# Patient Record
Sex: MALE | Race: WHITE | NOT HISPANIC OR LATINO | Employment: FULL TIME | URBAN - METROPOLITAN AREA
[De-identification: names, ages, dates, MRNs, and addresses within clinical notes are randomized per-mention and may not be internally consistent; named-entity substitution may affect disease eponyms.]

---

## 2019-08-15 ENCOUNTER — TRANSCRIBE ORDERS (OUTPATIENT)
Dept: ADMINISTRATIVE | Facility: HOSPITAL | Age: 59
End: 2019-08-15

## 2019-08-15 ENCOUNTER — APPOINTMENT (OUTPATIENT)
Dept: RADIOLOGY | Facility: CLINIC | Age: 59
End: 2019-08-15
Payer: COMMERCIAL

## 2019-08-15 DIAGNOSIS — M25.551 RIGHT HIP PAIN: ICD-10-CM

## 2019-08-15 DIAGNOSIS — M25.551 RIGHT HIP PAIN: Primary | ICD-10-CM

## 2019-08-15 PROCEDURE — 73502 X-RAY EXAM HIP UNI 2-3 VIEWS: CPT

## 2020-12-22 ENCOUNTER — APPOINTMENT (OUTPATIENT)
Dept: RADIOLOGY | Facility: CLINIC | Age: 60
End: 2020-12-22
Payer: COMMERCIAL

## 2020-12-22 ENCOUNTER — TRANSCRIBE ORDERS (OUTPATIENT)
Dept: URGENT CARE | Facility: CLINIC | Age: 60
End: 2020-12-22

## 2020-12-22 DIAGNOSIS — M25.551 RIGHT HIP PAIN: ICD-10-CM

## 2020-12-22 DIAGNOSIS — M25.551 RIGHT HIP PAIN: Primary | ICD-10-CM

## 2020-12-22 PROCEDURE — 73502 X-RAY EXAM HIP UNI 2-3 VIEWS: CPT

## 2021-03-01 DIAGNOSIS — K21.9 GASTROESOPHAGEAL REFLUX DISEASE WITHOUT ESOPHAGITIS: Primary | ICD-10-CM

## 2021-03-01 RX ORDER — OMEPRAZOLE 20 MG/1
20 CAPSULE, DELAYED RELEASE ORAL DAILY
Qty: 90 CAPSULE | Refills: 1 | Status: SHIPPED | OUTPATIENT
Start: 2021-03-01 | End: 2021-12-02

## 2021-03-01 NOTE — TELEPHONE ENCOUNTER
Left message on cell for patient to call and schedule f/u ov  Will call again in 1 wk if he doesn't return call

## 2021-03-08 NOTE — TELEPHONE ENCOUNTER
Left message on patient's cell to call and schedule f/u ov with Dr Max Guerra  Will call one more time in 2 weeks if he doesn't return call to schedule

## 2021-03-22 NOTE — TELEPHONE ENCOUNTER
Called and spoke with wife  He got my message and will call when ready to schedule  Will wait for patient to call

## 2021-08-19 ENCOUNTER — TELEPHONE (OUTPATIENT)
Dept: GASTROENTEROLOGY | Facility: CLINIC | Age: 61
End: 2021-08-19

## 2021-09-28 ENCOUNTER — TELEPHONE (OUTPATIENT)
Dept: GASTROENTEROLOGY | Facility: CLINIC | Age: 61
End: 2021-09-28

## 2021-09-28 NOTE — TELEPHONE ENCOUNTER
Recall call went out via [employer] in 2020 with no return calls from pt to schedule  Pt is overdue for both EGD and Colon with Dr Carl Culver for hx of adenomatous colon polyps/Padilla's  I lmohm for pt to please call back to schedule a procedure with Dr Carl Culver  If do not hear back form pt in two weeks, will mail recall letter for both EGD and colon

## 2021-10-27 NOTE — TELEPHONE ENCOUNTER
Returned Patient call to schedule FLIP with Dr Remington Whelan  Will call him when January schedule opens

## 2021-11-06 ENCOUNTER — HOSPITAL ENCOUNTER (EMERGENCY)
Facility: HOSPITAL | Age: 61
Discharge: HOME | End: 2021-11-06
Attending: EMERGENCY MEDICINE | Admitting: EMERGENCY MEDICINE
Payer: COMMERCIAL

## 2021-11-06 ENCOUNTER — APPOINTMENT (EMERGENCY)
Dept: RADIOLOGY | Facility: HOSPITAL | Age: 61
End: 2021-11-06
Payer: COMMERCIAL

## 2021-11-06 VITALS
TEMPERATURE: 99.7 F | RESPIRATION RATE: 18 BRPM | DIASTOLIC BLOOD PRESSURE: 61 MMHG | BODY MASS INDEX: 29.71 KG/M2 | WEIGHT: 210 LBS | SYSTOLIC BLOOD PRESSURE: 116 MMHG | HEART RATE: 78 BPM | OXYGEN SATURATION: 93 %

## 2021-11-06 DIAGNOSIS — R55 SYNCOPE: Primary | ICD-10-CM

## 2021-11-06 DIAGNOSIS — I95.1 ORTHOSTATIC HYPOTENSION: ICD-10-CM

## 2021-11-06 DIAGNOSIS — U07.1 COVID-19: ICD-10-CM

## 2021-11-06 LAB
ALBUMIN SERPL BCP-MCNC: 3.7 G/DL (ref 3.5–5)
ALP SERPL-CCNC: 67 U/L (ref 46–116)
ALT SERPL W P-5'-P-CCNC: 65 U/L (ref 12–78)
ANION GAP SERPL CALCULATED.3IONS-SCNC: 9 MMOL/L (ref 4–13)
APTT PPP: 35 SECONDS (ref 23–37)
AST SERPL W P-5'-P-CCNC: 95 U/L (ref 5–45)
BASOPHILS # BLD AUTO: 0.01 THOUSANDS/ΜL (ref 0–0.1)
BASOPHILS NFR BLD AUTO: 0 % (ref 0–1)
BILIRUB SERPL-MCNC: 0.63 MG/DL (ref 0.2–1)
BUN SERPL-MCNC: 19 MG/DL (ref 5–25)
CALCIUM SERPL-MCNC: 8.5 MG/DL (ref 8.3–10.1)
CHLORIDE SERPL-SCNC: 97 MMOL/L (ref 100–108)
CO2 SERPL-SCNC: 26 MMOL/L (ref 21–32)
CREAT SERPL-MCNC: 1.42 MG/DL (ref 0.6–1.3)
EOSINOPHIL # BLD AUTO: 0 THOUSAND/ΜL (ref 0–0.61)
EOSINOPHIL NFR BLD AUTO: 0 % (ref 0–6)
ERYTHROCYTE [DISTWIDTH] IN BLOOD BY AUTOMATED COUNT: 13.1 % (ref 11.6–15.1)
GFR SERPL CREATININE-BSD FRML MDRD: 54 ML/MIN/1.73SQ M
GLUCOSE SERPL-MCNC: 136 MG/DL (ref 65–140)
HCT VFR BLD AUTO: 42 % (ref 36.5–49.3)
HGB BLD-MCNC: 14.3 G/DL (ref 12–17)
IMM GRANULOCYTES # BLD AUTO: 0.03 THOUSAND/UL (ref 0–0.2)
IMM GRANULOCYTES NFR BLD AUTO: 1 % (ref 0–2)
INR PPP: 1.13 (ref 0.84–1.19)
LACTATE SERPL-SCNC: 1.6 MMOL/L (ref 0.5–2)
LYMPHOCYTES # BLD AUTO: 0.65 THOUSANDS/ΜL (ref 0.6–4.47)
LYMPHOCYTES NFR BLD AUTO: 11 % (ref 14–44)
MAGNESIUM SERPL-MCNC: 2 MG/DL (ref 1.6–2.6)
MCH RBC QN AUTO: 29.6 PG (ref 26.8–34.3)
MCHC RBC AUTO-ENTMCNC: 34 G/DL (ref 31.4–37.4)
MCV RBC AUTO: 87 FL (ref 82–98)
MONOCYTES # BLD AUTO: 0.42 THOUSAND/ΜL (ref 0.17–1.22)
MONOCYTES NFR BLD AUTO: 7 % (ref 4–12)
NEUTROPHILS # BLD AUTO: 5.06 THOUSANDS/ΜL (ref 1.85–7.62)
NEUTS SEG NFR BLD AUTO: 81 % (ref 43–75)
NRBC BLD AUTO-RTO: 0 /100 WBCS
PLATELET # BLD AUTO: 165 THOUSANDS/UL (ref 149–390)
PMV BLD AUTO: 9.4 FL (ref 8.9–12.7)
POTASSIUM SERPL-SCNC: 5.4 MMOL/L (ref 3.5–5.3)
PROT SERPL-MCNC: 7.8 G/DL (ref 6.4–8.2)
PROTHROMBIN TIME: 14.3 SECONDS (ref 11.6–14.5)
RBC # BLD AUTO: 4.83 MILLION/UL (ref 3.88–5.62)
SODIUM SERPL-SCNC: 132 MMOL/L (ref 136–145)
TROPONIN I SERPL-MCNC: <0.02 NG/ML
WBC # BLD AUTO: 6.17 THOUSAND/UL (ref 4.31–10.16)

## 2021-11-06 PROCEDURE — 96361 HYDRATE IV INFUSION ADD-ON: CPT

## 2021-11-06 PROCEDURE — 36415 COLL VENOUS BLD VENIPUNCTURE: CPT | Performed by: EMERGENCY MEDICINE

## 2021-11-06 PROCEDURE — 85025 COMPLETE CBC W/AUTO DIFF WBC: CPT | Performed by: EMERGENCY MEDICINE

## 2021-11-06 PROCEDURE — 85610 PROTHROMBIN TIME: CPT | Performed by: EMERGENCY MEDICINE

## 2021-11-06 PROCEDURE — 83735 ASSAY OF MAGNESIUM: CPT | Performed by: EMERGENCY MEDICINE

## 2021-11-06 PROCEDURE — 80053 COMPREHEN METABOLIC PANEL: CPT | Performed by: EMERGENCY MEDICINE

## 2021-11-06 PROCEDURE — 99284 EMERGENCY DEPT VISIT MOD MDM: CPT

## 2021-11-06 PROCEDURE — 71045 X-RAY EXAM CHEST 1 VIEW: CPT

## 2021-11-06 PROCEDURE — 87040 BLOOD CULTURE FOR BACTERIA: CPT | Performed by: EMERGENCY MEDICINE

## 2021-11-06 PROCEDURE — 83605 ASSAY OF LACTIC ACID: CPT | Performed by: EMERGENCY MEDICINE

## 2021-11-06 PROCEDURE — 96360 HYDRATION IV INFUSION INIT: CPT

## 2021-11-06 PROCEDURE — 84484 ASSAY OF TROPONIN QUANT: CPT | Performed by: EMERGENCY MEDICINE

## 2021-11-06 PROCEDURE — 85730 THROMBOPLASTIN TIME PARTIAL: CPT | Performed by: EMERGENCY MEDICINE

## 2021-11-06 PROCEDURE — 99284 EMERGENCY DEPT VISIT MOD MDM: CPT | Performed by: EMERGENCY MEDICINE

## 2021-11-06 RX ORDER — VALSARTAN 40 MG/1
TABLET ORAL DAILY
COMMUNITY

## 2021-11-06 RX ORDER — OMEPRAZOLE 40 MG/1
1 CAPSULE, DELAYED RELEASE ORAL DAILY
COMMUNITY
Start: 2012-02-27

## 2021-11-06 RX ORDER — ATORVASTATIN CALCIUM 10 MG/1
TABLET, FILM COATED ORAL DAILY
COMMUNITY

## 2021-11-06 RX ORDER — ACETAMINOPHEN 325 MG/1
975 TABLET ORAL ONCE
Status: COMPLETED | OUTPATIENT
Start: 2021-11-06 | End: 2021-11-06

## 2021-11-12 LAB
BACTERIA BLD CULT: NORMAL
BACTERIA BLD CULT: NORMAL

## 2021-11-16 ENCOUNTER — TELEPHONE (OUTPATIENT)
Dept: GASTROENTEROLOGY | Facility: CLINIC | Age: 61
End: 2021-11-16

## 2021-12-02 DIAGNOSIS — K21.9 GASTROESOPHAGEAL REFLUX DISEASE WITHOUT ESOPHAGITIS: ICD-10-CM

## 2021-12-02 RX ORDER — OMEPRAZOLE 20 MG/1
CAPSULE, DELAYED RELEASE ORAL
Qty: 90 CAPSULE | Refills: 1 | Status: SHIPPED | OUTPATIENT
Start: 2021-12-02 | End: 2022-06-08

## 2022-09-04 DIAGNOSIS — K21.9 GASTROESOPHAGEAL REFLUX DISEASE WITHOUT ESOPHAGITIS: ICD-10-CM

## 2022-09-08 RX ORDER — OMEPRAZOLE 20 MG/1
CAPSULE, DELAYED RELEASE ORAL
Qty: 90 CAPSULE | Refills: 0 | Status: SHIPPED | OUTPATIENT
Start: 2022-09-08

## 2022-11-28 ENCOUNTER — OFFICE VISIT (OUTPATIENT)
Dept: GASTROENTEROLOGY | Facility: CLINIC | Age: 62
End: 2022-11-28

## 2022-11-28 VITALS
HEIGHT: 71 IN | BODY MASS INDEX: 30.49 KG/M2 | HEART RATE: 83 BPM | DIASTOLIC BLOOD PRESSURE: 81 MMHG | SYSTOLIC BLOOD PRESSURE: 142 MMHG | WEIGHT: 217.8 LBS

## 2022-11-28 DIAGNOSIS — Z86.010 HX OF COLONIC POLYPS: ICD-10-CM

## 2022-11-28 DIAGNOSIS — K21.9 GASTROESOPHAGEAL REFLUX DISEASE WITHOUT ESOPHAGITIS: ICD-10-CM

## 2022-11-28 DIAGNOSIS — K22.70 BARRETT'S ESOPHAGUS WITHOUT DYSPLASIA: Primary | ICD-10-CM

## 2022-11-28 DIAGNOSIS — Z12.11 COLON CANCER SCREENING: ICD-10-CM

## 2022-11-28 DIAGNOSIS — Z86.010 HX OF ADENOMATOUS COLONIC POLYPS: ICD-10-CM

## 2022-11-28 PROBLEM — Z86.0100 HX OF COLONIC POLYPS: Status: ACTIVE | Noted: 2022-11-28

## 2022-11-28 RX ORDER — OMEPRAZOLE 20 MG/1
20 CAPSULE, DELAYED RELEASE ORAL DAILY
Qty: 90 CAPSULE | Refills: 3 | Status: SHIPPED | OUTPATIENT
Start: 2022-11-28 | End: 2023-02-26

## 2022-11-28 RX ORDER — SILDENAFIL 100 MG/1
100 TABLET, FILM COATED ORAL DAILY
COMMUNITY
Start: 2022-10-03

## 2022-11-28 NOTE — PATIENT INSTRUCTIONS
Continue to follow anti-reflux diet and measures    Scheduled date of EGD/colonoscopy (as of today): 1/16/23  Physician performing EGD/colonoscopy: Dr Estelle Vegas  Location of EGD/colonoscopy: [place] [id number]  Desired bowel prep reviewed with patient: Golytely  Instructions reviewed with patient by: Mallory   Clearances:   N/A  Pt is vaccinated

## 2022-11-28 NOTE — PROGRESS NOTES
[facility] - Outpatient Follow-up Note  Jovana Harris 63 y o  male MRN: [id number]  Encounter: [id number]          ASSESSMENT AND PLAN:      1  Gastroesophageal reflux disease without esophagitis  Patient has history of GERD but reports symptoms are well controlled on current medication  - omeprazole (PriLOSEC) 20 mg delayed release capsule; Take 1 capsule (20 mg total) by mouth daily Take 1/2 hour prior to breakfast  Dispense: 90 capsule; Refill: 3  -Reviewed side effects of PPI  Patient verbalized understanding   - EGD; further evaluation of GERD will be done at time of EGD  -Continue anti-reflux diet and measures    2  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  Patient has history of Padilla's esophagus  Last EGD done on 02/06/2000 which showed antritis, hiatal hernia, short-segment spirits  Biopsy stomach showed chronic inflammation of gastric type mucosa  Negative for dysplasia  Minimal focal intestinal metaplasia noted  Biopsy of EG junction showed chronic inflammation gastric type mucosa  Minimal focal intestinal metaplasia  Negative for dysplasia  - EGD; schedule for EGD  Prep and procedure explained to patient in detail  Further recommendations pending results of EGD  3  Hx of colonic polyps  Patient has history colonic polyps  Colonoscopy done 04/08/2015 which showed universal diverticulosis  Two polyps removed and internal hemorrhoids  Biopsy showed tubular adenoma negative for high-grade dysplasia and hyperplastic polyp   - Colonoscopy; schedule for colonoscopy  Prep and procedure explained to patient in detail  Further recommendations pending results of colonoscopy  - polyethylene glycol (GOLYTELY) 4000 mL solution; Take 4,000 mL by mouth once for 1 dose  Dispense: 4000 mL; Refill: 0    4  Colon cancer screening  Up-to-date  Last colonoscopy done 04/08/2015 which showed universal diverticulosis  Two polyps removed and internal hemorrhoids  Biopsy showed tubular adenoma negative for high-grade dysplasia and hyperplastic polyp       ______________________________________________________________________    SUBJECTIVE:  This is a 46-year-old male who presents to office for follow-up of GERD, Padilla's esophagus, and colon polyps  Patient currently denies any GI symptoms as long as he is taking his omeprazole  Patient denies nausea, vomiting, acid reflux, heartburn, dysphagia, epigastric or abdominal pain  Patient denies blood in stool, blood from rectal area, or black tarry stool  Bowel patterns regular  Mild tenderness in epigastric area on examination  Patient is overdue for colonoscopy for surveillance of colon polyps  Patient is on no blood thinners  EGD done on 02/06/2000 which showed antritis, hiatal hernia, short-segment spirits  Biopsy stomach showed chronic inflammation of gastric type mucosa  Negative for dysplasia  Minimal focal intestinal metaplasia noted  Biopsy of EG junction showed chronic inflammation gastric type mucosa  Minimal focal intestinal metaplasia  Negative for dysplasia  Colonoscopy done 04/08/2015 which showed universal diverticulosis  Two polyps removed and internal hemorrhoids  Biopsy showed tubular adenoma negative for high-grade dysplasia and hyperplastic polyp  Patient is a current smoker  Positive family history of esophageal cancer sister  No family history of gastric or colon cancer  REVIEW OF SYSTEMS IS OTHERWISE NEGATIVE        Historical Information   Past Medical History:   Diagnosis Date   • COPD (chronic obstructive pulmonary disease) ([facility] [address] )    • Diverticulitis    • Diverticulosis      Past Surgical History:   Procedure Laterality Date   • BOWEL RESECTION       Social History   Social History     Substance and Sexual Activity   Alcohol Use Not Currently     Social History     Substance and Sexual Activity   Drug Use Not Currently   • Types: Marijuana     Social History     Tobacco Use   Smoking Status Every Day   • Packs/day: 1 00   • Types: Cigarettes   Smokeless Tobacco Never     Family History   Problem Relation Age of Onset   • Esophageal cancer Sister        Meds/Allergies       Current Outpatient Medications:   •  atorvastatin (LIPITOR) 10 mg tablet  •  omeprazole (PriLOSEC) 20 mg delayed release capsule  •  polyethylene glycol (GOLYTELY) 4000 mL solution  •  valsartan (DIOVAN) 40 mg tablet  •  sildenafil (VIAGRA) 100 mg tablet    Allergies   Allergen Reactions   • Nsaids Hives     Reaction Date: 27Feb2012;              Objective     Blood pressure 142/81, pulse 83, height 5' 11" (1 803 m), weight 98 8 kg (217 lb 12 8 oz)  Body mass index is 30 38 kg/m²  PHYSICAL EXAM:      General Appearance:   Alert, cooperative, no distress   HEENT:   Normocephalic, atraumatic, anicteric      Neck:  Supple, symmetrical, trachea midline   Lungs:   Clear to auscultation bilaterally; no rales, rhonchi or wheezing; respirations unlabored    Heart[de-identified]   Regular rate and rhythm; no murmur, rub, or gallop  Abdomen:   Soft, mild tenderness in epigastric area with palpation, non-distended; normal bowel sounds; no masses, no organomegaly    Genitalia:   Deferred    Rectal:   Deferred    Extremities:  No cyanosis, clubbing or edema    Pulses:  2+ and symmetric    Skin:  No jaundice, rashes, or lesions    Lymph nodes:  No palpable cervical lymphadenopathy        Lab Results:   No visits with results within 1 Day(s) from this visit     Latest known visit with results is:   Admission on 11/06/2021, Discharged on 11/06/2021   Component Date Value   • Sodium 11/06/2021 132 (L)    • Potassium 11/06/2021 5 4 (H)    • Chloride 11/06/2021 97 (L)    • CO2 11/06/2021 26    • ANION GAP 11/06/2021 9    • BUN 11/06/2021 19    • Creatinine 11/06/2021 1 42 (H)    • Glucose 11/06/2021 136    • Calcium 11/06/2021 8 5    • AST 11/06/2021 95 (H)    • ALT 11/06/2021 65    • Alkaline Phosphatase 11/06/2021 67    • Total Protein 11/06/2021 7 8    • Albumin 11/06/2021 3 7    • Total Bilirubin 11/06/2021 0 63    • eGFR 11/06/2021 54    • Magnesium 11/06/2021 2 0    • Troponin I 11/06/2021 <0 02    • Protime 11/06/2021 14 3    • INR 11/06/2021 1 13    • PTT 11/06/2021 35    • LACTIC ACID 11/06/2021 1 6    • Blood Culture 11/06/2021 No Growth After 5 Days  • Blood Culture 11/06/2021 No Growth After 5 Days  • WBC 11/06/2021 6 17    • RBC 11/06/2021 4 83    • Hemoglobin 11/06/2021 14 3    • Hematocrit 11/06/2021 42 0    • MCV 11/06/2021 87    • MCH 11/06/2021 29 6    • MCHC 11/06/2021 34 0    • RDW 11/06/2021 13 1    • MPV 11/06/2021 9 4    • Platelets 01/52/9434 165    • nRBC 11/06/2021 0    • Neutrophils Relative 11/06/2021 81 (H)    • Immat GRANS % 11/06/2021 1    • Lymphocytes Relative 11/06/2021 11 (L)    • Monocytes Relative 11/06/2021 7    • Eosinophils Relative 11/06/2021 0    • Basophils Relative 11/06/2021 0    • Neutrophils Absolute 11/06/2021 5 06    • Immature Grans Absolute 11/06/2021 0 03    • Lymphocytes Absolute 11/06/2021 0 65    • Monocytes Absolute 11/06/2021 0 42    • Eosinophils Absolute 11/06/2021 0 00    • Basophils Absolute 11/06/2021 0 01          Radiology Results:   No results found

## 2023-01-05 DIAGNOSIS — Z20.822 COVID-19 RULED OUT BY LABORATORY TESTING: Primary | ICD-10-CM

## 2023-01-10 ENCOUNTER — TELEPHONE (OUTPATIENT)
Dept: GASTROENTEROLOGY | Facility: CLINIC | Age: 63
End: 2023-01-10

## 2023-01-10 NOTE — TELEPHONE ENCOUNTER
Spoke to pt's wife confirming pt's  egd/confirmation scheduled on 1/16/23 at [facility] with Dr Leopoldo Blew  Informed [facility] would be calling the day prior with the arrival time  Pt has instructions and did not have any questions

## 2023-01-16 ENCOUNTER — HOSPITAL ENCOUNTER (OUTPATIENT)
Facility: HOSPITAL | Age: 63
Setting detail: OBSERVATION
Discharge: HOME | End: 2023-01-17
Attending: FAMILY MEDICINE | Admitting: FAMILY MEDICINE

## 2023-01-16 ENCOUNTER — ANESTHESIA EVENT (OUTPATIENT)
Dept: GASTROENTEROLOGY | Facility: AMBULARY SURGERY CENTER | Age: 63
End: 2023-01-16

## 2023-01-16 ENCOUNTER — HOSPITAL ENCOUNTER (OUTPATIENT)
Dept: GASTROENTEROLOGY | Facility: AMBULARY SURGERY CENTER | Age: 63
Setting detail: OUTPATIENT SURGERY
Discharge: HOME | End: 2023-01-16
Attending: INTERNAL MEDICINE

## 2023-01-16 ENCOUNTER — ANESTHESIA (OUTPATIENT)
Dept: GASTROENTEROLOGY | Facility: AMBULARY SURGERY CENTER | Age: 63
End: 2023-01-16

## 2023-01-16 VITALS
WEIGHT: 220 LBS | HEART RATE: 58 BPM | DIASTOLIC BLOOD PRESSURE: 73 MMHG | BODY MASS INDEX: 30.68 KG/M2 | OXYGEN SATURATION: 97 % | RESPIRATION RATE: 18 BRPM | TEMPERATURE: 98 F | SYSTOLIC BLOOD PRESSURE: 137 MMHG

## 2023-01-16 DIAGNOSIS — Z86.010 HX OF ADENOMATOUS COLONIC POLYPS: ICD-10-CM

## 2023-01-16 DIAGNOSIS — K21.9 GASTROESOPHAGEAL REFLUX DISEASE WITHOUT ESOPHAGITIS: ICD-10-CM

## 2023-01-16 DIAGNOSIS — K22.70 BARRETT'S ESOPHAGUS WITHOUT DYSPLASIA: ICD-10-CM

## 2023-01-16 DIAGNOSIS — Z86.010 HX OF COLONIC POLYPS: ICD-10-CM

## 2023-01-16 PROBLEM — E78.5 DYSLIPIDEMIA: Status: ACTIVE | Noted: 2023-01-16

## 2023-01-16 PROBLEM — K92.2 GI BLEED: Status: ACTIVE | Noted: 2023-01-16

## 2023-01-16 LAB
ALBUMIN SERPL BCP-MCNC: 3.6 G/DL (ref 3.5–5)
ALP SERPL-CCNC: 69 U/L (ref 46–116)
ALT SERPL W P-5'-P-CCNC: 41 U/L (ref 12–78)
ANION GAP SERPL CALCULATED.3IONS-SCNC: 7 MMOL/L (ref 4–13)
AST SERPL W P-5'-P-CCNC: 24 U/L (ref 5–45)
BASOPHILS # BLD AUTO: 0.05 THOUSANDS/ÂΜL (ref 0–0.1)
BASOPHILS NFR BLD AUTO: 1 % (ref 0–1)
BILIRUB SERPL-MCNC: 0.54 MG/DL (ref 0.2–1)
BUN SERPL-MCNC: 16 MG/DL (ref 5–25)
CALCIUM SERPL-MCNC: 8.4 MG/DL (ref 8.3–10.1)
CHLORIDE SERPL-SCNC: 105 MMOL/L (ref 96–108)
CO2 SERPL-SCNC: 27 MMOL/L (ref 21–32)
CREAT SERPL-MCNC: 1.18 MG/DL (ref 0.6–1.3)
EOSINOPHIL # BLD AUTO: 0.09 THOUSAND/ÂΜL (ref 0–0.61)
EOSINOPHIL NFR BLD AUTO: 1 % (ref 0–6)
ERYTHROCYTE [DISTWIDTH] IN BLOOD BY AUTOMATED COUNT: 12.7 % (ref 11.6–15.1)
GFR SERPL CREATININE-BSD FRML MDRD: 66 ML/MIN/1.73SQ M
GLUCOSE SERPL-MCNC: 105 MG/DL (ref 65–140)
HCT VFR BLD AUTO: 40.5 % (ref 36.5–49.3)
HCT VFR BLD AUTO: 40.9 % (ref 36.5–49.3)
HGB BLD-MCNC: 13.6 G/DL (ref 12–17)
HGB BLD-MCNC: 13.7 G/DL (ref 12–17)
IMM GRANULOCYTES # BLD AUTO: 0.02 THOUSAND/UL (ref 0–0.2)
IMM GRANULOCYTES NFR BLD AUTO: 0 % (ref 0–2)
LYMPHOCYTES # BLD AUTO: 1.82 THOUSANDS/ÂΜL (ref 0.6–4.47)
LYMPHOCYTES NFR BLD AUTO: 24 % (ref 14–44)
MCH RBC QN AUTO: 29.9 PG (ref 26.8–34.3)
MCHC RBC AUTO-ENTMCNC: 33.5 G/DL (ref 31.4–37.4)
MCV RBC AUTO: 89 FL (ref 82–98)
MONOCYTES # BLD AUTO: 0.48 THOUSAND/ÂΜL (ref 0.17–1.22)
MONOCYTES NFR BLD AUTO: 6 % (ref 4–12)
NEUTROPHILS # BLD AUTO: 4.99 THOUSANDS/ÂΜL (ref 1.85–7.62)
NEUTS SEG NFR BLD AUTO: 68 % (ref 43–75)
NRBC BLD AUTO-RTO: 0 /100 WBCS
PLATELET # BLD AUTO: 249 THOUSANDS/UL (ref 149–390)
PMV BLD AUTO: 9.4 FL (ref 8.9–12.7)
POTASSIUM SERPL-SCNC: 3.5 MMOL/L (ref 3.5–5.3)
PROT SERPL-MCNC: 6.4 G/DL (ref 6.4–8.4)
RBC # BLD AUTO: 4.58 MILLION/UL (ref 3.88–5.62)
SODIUM SERPL-SCNC: 139 MMOL/L (ref 135–147)
WBC # BLD AUTO: 7.45 THOUSAND/UL (ref 4.31–10.16)

## 2023-01-16 RX ORDER — LIDOCAINE HYDROCHLORIDE 20 MG/ML
INJECTION, SOLUTION EPIDURAL; INFILTRATION; INTRACAUDAL; PERINEURAL AS NEEDED
Status: DISCONTINUED | OUTPATIENT
Start: 2023-01-16 | End: 2023-01-16

## 2023-01-16 RX ORDER — ATORVASTATIN CALCIUM 20 MG/1
20 TABLET, FILM COATED ORAL
Status: DISCONTINUED | OUTPATIENT
Start: 2023-01-16 | End: 2023-01-17 | Stop reason: HOSPADM

## 2023-01-16 RX ORDER — SODIUM CHLORIDE, SODIUM LACTATE, POTASSIUM CHLORIDE, CALCIUM CHLORIDE 600; 310; 30; 20 MG/100ML; MG/100ML; MG/100ML; MG/100ML
INJECTION, SOLUTION INTRAVENOUS CONTINUOUS PRN
Status: DISCONTINUED | OUTPATIENT
Start: 2023-01-16 | End: 2023-01-16

## 2023-01-16 RX ORDER — METHOCARBAMOL 500 MG/1
500 TABLET, FILM COATED ORAL EVERY 8 HOURS SCHEDULED
Status: DISCONTINUED | OUTPATIENT
Start: 2023-01-16 | End: 2023-01-17 | Stop reason: HOSPADM

## 2023-01-16 RX ORDER — LOSARTAN POTASSIUM 50 MG/1
100 TABLET ORAL DAILY
Status: DISCONTINUED | OUTPATIENT
Start: 2023-01-17 | End: 2023-01-17 | Stop reason: HOSPADM

## 2023-01-16 RX ORDER — ACETAMINOPHEN 325 MG/1
650 TABLET ORAL EVERY 6 HOURS PRN
Status: DISCONTINUED | OUTPATIENT
Start: 2023-01-16 | End: 2023-01-17 | Stop reason: HOSPADM

## 2023-01-16 RX ORDER — PROPOFOL 10 MG/ML
INJECTION, EMULSION INTRAVENOUS CONTINUOUS PRN
Status: DISCONTINUED | OUTPATIENT
Start: 2023-01-16 | End: 2023-01-16

## 2023-01-16 RX ORDER — PROPOFOL 10 MG/ML
INJECTION, EMULSION INTRAVENOUS AS NEEDED
Status: DISCONTINUED | OUTPATIENT
Start: 2023-01-16 | End: 2023-01-16

## 2023-01-16 RX ORDER — METHOCARBAMOL 500 MG/1
500 TABLET, FILM COATED ORAL EVERY 8 HOURS SCHEDULED
Status: DISCONTINUED | OUTPATIENT
Start: 2023-01-16 | End: 2023-01-16

## 2023-01-16 RX ORDER — PANTOPRAZOLE SODIUM 40 MG/1
40 TABLET, DELAYED RELEASE ORAL
Status: DISCONTINUED | OUTPATIENT
Start: 2023-01-17 | End: 2023-01-17 | Stop reason: HOSPADM

## 2023-01-16 RX ADMIN — ATORVASTATIN CALCIUM 20 MG: 20 TABLET, FILM COATED ORAL at 17:01

## 2023-01-16 RX ADMIN — PROPOFOL 50 MG: 10 INJECTION, EMULSION INTRAVENOUS at 10:40

## 2023-01-16 RX ADMIN — LIDOCAINE HYDROCHLORIDE 100 MG: 20 INJECTION, SOLUTION EPIDURAL; INFILTRATION; INTRACAUDAL; PERINEURAL at 10:39

## 2023-01-16 RX ADMIN — PROPOFOL 100 MG: 10 INJECTION, EMULSION INTRAVENOUS at 10:39

## 2023-01-16 RX ADMIN — PROPOFOL 50 MG: 10 INJECTION, EMULSION INTRAVENOUS at 10:43

## 2023-01-16 RX ADMIN — ACETAMINOPHEN 650 MG: 325 TABLET, FILM COATED ORAL at 15:50

## 2023-01-16 RX ADMIN — SODIUM CHLORIDE, SODIUM LACTATE, POTASSIUM CHLORIDE, AND CALCIUM CHLORIDE: .6; .31; .03; .02 INJECTION, SOLUTION INTRAVENOUS at 10:38

## 2023-01-16 RX ADMIN — PROPOFOL 130 MCG/KG/MIN: 10 INJECTION, EMULSION INTRAVENOUS at 10:48

## 2023-01-16 NOTE — PLAN OF CARE
Problem: Potential for Falls  Goal: Patient will remain free of falls  Description: INTERVENTIONS:  - Educate patient/family on patient safety including physical limitations  - Instruct patient to call for assistance with activity   - Consult OT/PT to assist with strengthening/mobility   - Keep Call bell within reach  - Keep bed low and locked with side rails adjusted as appropriate  - Keep care items and personal belongings within reach  - Initiate and maintain comfort rounds  - Make Fall Risk Sign visible to staff  - Offer Toileting every 2 Hours, in advance of need  - Initiate/Maintain bed/chair alarm  - Obtain necessary fall risk management equipment: bed/chair alarm  - Apply yellow socks and bracelet for high fall risk patients  - Consider moving patient to room near nurses station  Outcome: Progressing     Problem: PAIN - ADULT  Goal: Verbalizes/displays adequate comfort level or baseline comfort level  Description: Interventions:  - Encourage patient to monitor pain and request assistance  - Assess pain using appropriate pain scale  - Administer analgesics based on type and severity of pain and evaluate response  - Implement non-pharmacological measures as appropriate and evaluate response  - Consider cultural and social influences on pain and pain management  - Notify physician/advanced practitioner if interventions unsuccessful or patient reports new pain  Outcome: Progressing     Problem: INFECTION - ADULT  Goal: Absence or prevention of progression during hospitalization  Description: INTERVENTIONS:  - Assess and monitor for signs and symptoms of infection  - Monitor lab/diagnostic results  - Monitor all insertion sites, i e  indwelling lines, tubes, and drains  - Monitor endotracheal if appropriate and nasal secretions for changes in amount and color  - O'Brien appropriate cooling/warming therapies per order  - Administer medications as ordered  - Instruct and encourage patient and family to use good hand hygiene technique  - Identify and instruct in appropriate isolation precautions for identified infection/condition  Outcome: Progressing     Problem: SAFETY ADULT  Goal: Patient will remain free of falls  Description: INTERVENTIONS:  - Educate patient/family on patient safety including physical limitations  - Instruct patient to call for assistance with activity   - Consult OT/PT to assist with strengthening/mobility   - Keep Call bell within reach  - Keep bed low and locked with side rails adjusted as appropriate  - Keep care items and personal belongings within reach  - Initiate and maintain comfort rounds  - Make Fall Risk Sign visible to staff  - Offer Toileting every 2 Hours, in advance of need  - Initiate/Maintain bed/chair alarm  - Obtain necessary fall risk management equipment: bed/chair alarm  - Apply yellow socks and bracelet for high fall risk patients  - Consider moving patient to room near nurses station  Outcome: Progressing  Goal: Maintain or return to baseline ADL function  Description: INTERVENTIONS:  -  Assess patient's ability to carry out ADLs; assess patient's baseline for ADL function and identify physical deficits which impact ability to perform ADLs (bathing, care of mouth/teeth, toileting, grooming, dressing, etc )  - Assess/evaluate cause of self-care deficits   - Assess range of motion  - Assess patient's mobility; develop plan if impaired  - Assess patient's need for assistive devices and provide as appropriate  - Encourage maximum independence but intervene and supervise when necessary  - Involve family in performance of ADLs  - Assess for home care needs following discharge   - Consider OT consult to assist with ADL evaluation and planning for discharge  - Provide patient education as appropriate  Outcome: Progressing  Goal: Maintains/Returns to pre admission functional level  Description: INTERVENTIONS:  - Perform BMAT or MOVE assessment daily    - Set and communicate daily mobility goal to care team and patient/family/caregiver  - Collaborate with rehabilitation services on mobility goals if consulted  - Perform Range of Motion  3 times a day  - Reposition patient every 3 hours  - Dangle patient 3 times a day  - Stand patient 3 times a day  - Ambulate patient 3 times a day  - Out of bed to chair 3 times a day   - Out of bed for meals 3 times a day  - Out of bed for toileting  - Record patient progress and toleration of activity level   Outcome: Progressing     Problem: DISCHARGE PLANNING  Goal: Discharge to home or other facility with appropriate resources  Description: INTERVENTIONS:  - Identify barriers to discharge w/patient and caregiver  - Arrange for needed discharge resources and transportation as appropriate  - Identify discharge learning needs (meds, wound care, etc )  - Arrange for interpretive services to assist at discharge as needed  - Refer to Case Management Department for coordinating discharge planning if the patient needs post-hospital services based on physician/advanced practitioner order or complex needs related to functional status, cognitive ability, or social support system  Outcome: Progressing     Problem: Knowledge Deficit  Goal: Patient/family/caregiver demonstrates understanding of disease process, treatment plan, medications, and discharge instructions  Description: Complete learning assessment and assess knowledge base    Interventions:  - Provide teaching at level of understanding  - Provide teaching via preferred learning methods  Outcome: Progressing

## 2023-01-16 NOTE — H&P
History and Physical - [facility]  Simran Quispe 63 y o  male MRN: [id number]                  HPI: Simran Quispe is a 63y o  year old male who presents for EGD and colonoscopy due to Padilla's esophagus and adenomatous colon polyps  Last colonoscopy       REVIEW OF SYSTEMS: Per the HPI, and otherwise unremarkable      Historical Information   Past Medical History:   Diagnosis Date   • Anesthesia complication     difficulty waking up   • Padilla esophagus    • Colon polyp    • COPD (chronic obstructive pulmonary disease) (HCC)    • COVID-19 2021   • Diverticulitis     colon resection   • Diverticulosis    • Hyperlipidemia    • Hypertension    • Wears glasses      Past Surgical History:   Procedure Laterality Date   • BICEPS TENDON REPAIR     • BOWEL RESECTION      due to diverticulitis   • COLONOSCOPY     • HERNIA REPAIR       Social History   Social History     Substance and Sexual Activity   Alcohol Use Yes    Comment: occ beer     Social History     Substance and Sexual Activity   Drug Use Not Currently   • Types: Marijuana     Social History     Tobacco Use   Smoking Status Former   • Packs/day: 1 00   • Types: Cigarettes   • Quit date: 2018   • Years since quittin 0   Smokeless Tobacco Never     Family History   Problem Relation Age of Onset   • Esophageal cancer Sister        Meds/Allergies       Current Outpatient Medications:   •  Black Pepper-Turmeric (TURMERIC COMPLEX/BLACK PEPPER PO)  •  Coenzyme Q10 (Co Q 10) 100 MG CAPS  •  multivitamin (THERAGRAN) TABS  •  omeprazole (PriLOSEC) 20 mg delayed release capsule  •  valsartan (DIOVAN) 160 mg tablet  •  atorvastatin (LIPITOR) 20 mg tablet  •  polyethylene glycol (GOLYTELY) 4000 mL solution  •  sildenafil (VIAGRA) 100 mg tablet    Allergies   Allergen Reactions   • Nsaids Hives     Reaction Date: 2012;          Objective     /97   Pulse 81   Temp 98 °F (36 7 °C)   Resp 18   Wt 99 8 kg (220 lb) SpO2 96%   BMI 30 68 kg/m²       PHYSICAL EXAM    Gen: NAD  Head: NCAT  CV: RRR  CHEST: Clear  ABD: soft, NT/ND  EXT: no edema      ASSESSMENT/PLAN:  This is a 63y o  year old male here for EGD and colonoscopy, and he is stable and optimized for his procedure

## 2023-01-16 NOTE — ASSESSMENT & PLAN NOTE
Patient had outpatient EGD and colonoscopy and admitted as a direct admission for observation due to blood in the colon  Patient states that just prior to starting his bowel prep yesterday he had a bowel movement with some rectal bleeding  Continue to have blood with BMs but then his last 2 bowel movements this morning were clear  History of prior sigmoid resection  · On colonoscopy today patient was noted to have scattered diverticulosis, internal hemorrhoids, blood in the colon but no active bleeding noted    1 polyp was removed  · Possibly diverticular bleed  · CBC was done with stable hemoglobin  · Rpt H/H in 6 hours, sooner if patient has active bleeding  · Discussed with GI who recommended clear liquid diet, monitoring overnight and if patient has active bleeding consult GI and obtain high-volume CTA  · Discussed with patient to notify us if he has any further active bleeding

## 2023-01-16 NOTE — ANESTHESIA POSTPROCEDURE EVALUATION
Post-Op Assessment Note    CV Status:  Stable  Pain Score: 0    Pain management: adequate     Mental Status:  Sleepy   Hydration Status:  Euvolemic   PONV Controlled:  Controlled   Airway Patency:  Patent      Post Op Vitals Reviewed: Yes      Staff: CRNA, Anesthesiologist         No notable events documented      BP   100/60   Temp      Pulse  68   Resp   14   SpO2   100

## 2023-01-16 NOTE — ANESTHESIA PREPROCEDURE EVALUATION
Procedure:  COLONOSCOPY  EGD    Relevant Problems   GI/HEPATIC   (+) Gastroesophageal reflux disease without esophagitis      NEURO/PSYCH   (+) Hx of colonic polyps        Physical Exam    Airway    Mallampati score: II  TM Distance: >3 FB  Neck ROM: full     Dental   No notable dental hx     Cardiovascular  Cardiovascular exam normal    Pulmonary  Pulmonary exam normal     Other Findings        Anesthesia Plan  ASA Score- 2     Anesthesia Type- IV sedation with anesthesia with ASA Monitors  Additional Monitors:   Airway Plan:           Plan Factors-Exercise tolerance (METS): >4 METS  Chart reviewed  Imaging results reviewed  Existing labs reviewed  Patient summary reviewed  Patient is not a current smoker  Induction-     Postoperative Plan-     Informed Consent- Anesthetic plan and risks discussed with patient  I personally reviewed this patient with the CRNA  Discussed and agreed on the Anesthesia Plan with the CRNA  Donnie Santoyo

## 2023-01-17 VITALS
RESPIRATION RATE: 18 BRPM | TEMPERATURE: 97.7 F | OXYGEN SATURATION: 97 % | SYSTOLIC BLOOD PRESSURE: 127 MMHG | HEART RATE: 58 BPM | DIASTOLIC BLOOD PRESSURE: 72 MMHG | BODY MASS INDEX: 31.57 KG/M2 | HEIGHT: 70 IN

## 2023-01-17 LAB
ANION GAP SERPL CALCULATED.3IONS-SCNC: 11 MMOL/L (ref 4–13)
BUN SERPL-MCNC: 15 MG/DL (ref 5–25)
CALCIUM SERPL-MCNC: 8.7 MG/DL (ref 8.3–10.1)
CHLORIDE SERPL-SCNC: 106 MMOL/L (ref 96–108)
CO2 SERPL-SCNC: 25 MMOL/L (ref 21–32)
CREAT SERPL-MCNC: 1.12 MG/DL (ref 0.6–1.3)
ERYTHROCYTE [DISTWIDTH] IN BLOOD BY AUTOMATED COUNT: 12.7 % (ref 11.6–15.1)
GFR SERPL CREATININE-BSD FRML MDRD: 71 ML/MIN/1.73SQ M
GLUCOSE SERPL-MCNC: 100 MG/DL (ref 65–140)
HCT VFR BLD AUTO: 44.4 % (ref 36.5–49.3)
HGB BLD-MCNC: 14.9 G/DL (ref 12–17)
MAGNESIUM SERPL-MCNC: 2 MG/DL (ref 1.6–2.6)
MCH RBC QN AUTO: 29.6 PG (ref 26.8–34.3)
MCHC RBC AUTO-ENTMCNC: 33.6 G/DL (ref 31.4–37.4)
MCV RBC AUTO: 88 FL (ref 82–98)
PLATELET # BLD AUTO: 267 THOUSANDS/UL (ref 149–390)
PMV BLD AUTO: 9.2 FL (ref 8.9–12.7)
POTASSIUM SERPL-SCNC: 4.1 MMOL/L (ref 3.5–5.3)
RBC # BLD AUTO: 5.04 MILLION/UL (ref 3.88–5.62)
SODIUM SERPL-SCNC: 142 MMOL/L (ref 135–147)
WBC # BLD AUTO: 7.18 THOUSAND/UL (ref 4.31–10.16)

## 2023-01-17 RX ADMIN — PANTOPRAZOLE SODIUM 40 MG: 40 TABLET, DELAYED RELEASE ORAL at 06:15

## 2023-01-17 RX ADMIN — LOSARTAN POTASSIUM 100 MG: 50 TABLET, FILM COATED ORAL at 08:54

## 2023-01-17 NOTE — PLAN OF CARE
Problem: Potential for Falls  Goal: Patient will remain free of falls  Description: INTERVENTIONS:  - Educate patient/family on patient safety including physical limitations  - Instruct patient to call for assistance with activity   - Consult OT/PT to assist with strengthening/mobility   - Keep Call bell within reach  - Keep bed low and locked with side rails adjusted as appropriate  - Keep care items and personal belongings within reach  - Initiate and maintain comfort rounds  - Make Fall Risk Sign visible to staff  - Offer Toileting every 2 Hours, in advance of need  - Initiate/Maintain bed/chair alarm  - Obtain necessary fall risk management equipment: bed/chair alarm  - Apply yellow socks and bracelet for high fall risk patients  - Consider moving patient to room near nurses station  Outcome: Progressing     Problem: PAIN - ADULT  Goal: Verbalizes/displays adequate comfort level or baseline comfort level  Description: Interventions:  - Encourage patient to monitor pain and request assistance  - Assess pain using appropriate pain scale  - Administer analgesics based on type and severity of pain and evaluate response  - Implement non-pharmacological measures as appropriate and evaluate response  - Consider cultural and social influences on pain and pain management  - Notify physician/advanced practitioner if interventions unsuccessful or patient reports new pain  Outcome: Progressing     Problem: INFECTION - ADULT  Goal: Absence or prevention of progression during hospitalization  Description: INTERVENTIONS:  - Assess and monitor for signs and symptoms of infection  - Monitor lab/diagnostic results  - Monitor all insertion sites, i e  indwelling lines, tubes, and drains  - Monitor endotracheal if appropriate and nasal secretions for changes in amount and color  - Dinosaur appropriate cooling/warming therapies per order  - Administer medications as ordered  - Instruct and encourage patient and family to use good hand hygiene technique  - Identify and instruct in appropriate isolation precautions for identified infection/condition  Outcome: Progressing     Problem: SAFETY ADULT  Goal: Patient will remain free of falls  Description: INTERVENTIONS:  - Educate patient/family on patient safety including physical limitations  - Instruct patient to call for assistance with activity   - Consult OT/PT to assist with strengthening/mobility   - Keep Call bell within reach  - Keep bed low and locked with side rails adjusted as appropriate  - Keep care items and personal belongings within reach  - Initiate and maintain comfort rounds  - Make Fall Risk Sign visible to staff  - Offer Toileting every 2 Hours, in advance of need  - Initiate/Maintain bed/chair alarm  - Obtain necessary fall risk management equipment: bed/chair alarm  - Apply yellow socks and bracelet for high fall risk patients  - Consider moving patient to room near nurses station  Outcome: Progressing  Goal: Maintain or return to baseline ADL function  Description: INTERVENTIONS:  -  Assess patient's ability to carry out ADLs; assess patient's baseline for ADL function and identify physical deficits which impact ability to perform ADLs (bathing, care of mouth/teeth, toileting, grooming, dressing, etc )  - Assess/evaluate cause of self-care deficits   - Assess range of motion  - Assess patient's mobility; develop plan if impaired  - Assess patient's need for assistive devices and provide as appropriate  - Encourage maximum independence but intervene and supervise when necessary  - Involve family in performance of ADLs  - Assess for home care needs following discharge   - Consider OT consult to assist with ADL evaluation and planning for discharge  - Provide patient education as appropriate  Outcome: Progressing  Goal: Maintains/Returns to pre admission functional level  Description: INTERVENTIONS:  - Perform BMAT or MOVE assessment daily    - Set and communicate daily mobility goal to care team and patient/family/caregiver  - Collaborate with rehabilitation services on mobility goals if consulted  - Perform Range of Motion  3 times a day  - Reposition patient every 3 hours  - Dangle patient 3 times a day  - Stand patient 3 times a day  - Ambulate patient 3 times a day  - Out of bed to chair 3 times a day   - Out of bed for meals 3 times a day  - Out of bed for toileting  - Record patient progress and toleration of activity level   Outcome: Progressing     Problem: DISCHARGE PLANNING  Goal: Discharge to home or other facility with appropriate resources  Description: INTERVENTIONS:  - Identify barriers to discharge w/patient and caregiver  - Arrange for needed discharge resources and transportation as appropriate  - Identify discharge learning needs (meds, wound care, etc )  - Arrange for interpretive services to assist at discharge as needed  - Refer to Case Management Department for coordinating discharge planning if the patient needs post-hospital services based on physician/advanced practitioner order or complex needs related to functional status, cognitive ability, or social support system  Outcome: Progressing     Problem: Knowledge Deficit  Goal: Patient/family/caregiver demonstrates understanding of disease process, treatment plan, medications, and discharge instructions  Description: Complete learning assessment and assess knowledge base    Interventions:  - Provide teaching at level of understanding  - Provide teaching via preferred learning methods  Outcome: Progressing

## 2023-01-17 NOTE — NURSING NOTE
Discussed discharge instructions with patient and spouse  Pt needs to follow-up with GI, pt verbalizes understanding  No c/o pain, discomfort  No bloody BM's throughout shift  Pt stable upon discharge  Walked patient to lobby, steady gait

## 2023-01-17 NOTE — UTILIZATION REVIEW
Initial Clinical Review    Admission: Date/Time/Statement:   Admission Orders (From admission, onward)     Ordered        01/16/23 1525  Place in Observation  Once                      Orders Placed This Encounter   Procedures   • Place in Observation     Standing Status:   Standing     Number of Occurrences:   1     Order Specific Question:   Level of Care     Answer:   Med Surg [16]     Initial Presentation  63 yom from outpatient EGD and colonoscopy for observation due to blood in the colon  Patient states that just prior to starting his bowel prep yesterday he had a bowel movement with some rectal bleeding  Continue to have blood with BMs but then his last 2 bowel movements this morning were clear  History of prior sigmoid resection  On colonoscopy today patient was noted to have scattered diverticulosis, internal hemorrhoids, blood in the colon but no active bleeding noted  1 polyp was removed  Possibly diverticular bleed  CBC was done with stable hemoglobin  Rpt H/H in 6 hours, sooner if patient has active bleeding  Discussed with GI who recommended clear liquid diet, monitoring overnight and if patient has active bleeding consult GI and obtain high-volume CTA  Placed in observation status for rectal bleed         ED Triage Vitals   Temperature Pulse Respirations Blood Pressure SpO2   01/16/23 1900 01/16/23 1900 01/16/23 1900 01/16/23 1900 01/16/23 1900   98 6 °F (37 °C) 59 18 165/83 97 %      Temp Source Heart Rate Source Patient Position - Orthostatic VS BP Location FiO2 (%)   01/16/23 1900 01/16/23 2321 01/16/23 1900 01/16/23 1900 --   Oral Monitor Lying Right arm       Pain Score       01/16/23 1524       3          Wt Readings from Last 1 Encounters:   01/16/23 99 8 kg (220 lb)     Additional Vital Signs:   01/17/23 0700 97 7 °F (36 5 °C) 58 18 127/72 95 -- -- Lying   01/17/23 0325 97 7 °F (36 5 °C) 65 18 131/71 -- 97 % None (Room air) Lying   01/16/23 2321 97 8 °F (36 6 °C) 61 18 121/63 82 96 % None (Room air) Lying   01/16/23 1900 98 6 °F (37 °C) 59 18 165/83 118 97 % None (Room air) Lying     Pertinent Labs/Diagnostic Test Results:     Results from last 7 days   Lab Units 01/17/23  0624 01/16/23  1848 01/16/23  1116   WBC Thousand/uL 7 18  --  7 45   HEMOGLOBIN g/dL 14 9 13 6 13 7   HEMATOCRIT % 44 4 40 5 40 9   PLATELETS Thousands/uL 267  --  249   NEUTROS ABS Thousands/µL  --   --  4 99       Results from last 7 days   Lab Units 01/17/23  0624 01/16/23  1848   SODIUM mmol/L 142 139   POTASSIUM mmol/L 4 1 3 5   CHLORIDE mmol/L 106 105   CO2 mmol/L 25 27   ANION GAP mmol/L 11 7   BUN mg/dL 15 16   CREATININE mg/dL 1 12 1 18   EGFR ml/min/1 73sq m 71 66   CALCIUM mg/dL 8 7 8 4   MAGNESIUM mg/dL 2 0  --      Results from last 7 days   Lab Units 01/16/23  1848   AST U/L 24   ALT U/L 41   ALK PHOS U/L 69   TOTAL PROTEIN g/dL 6 4   ALBUMIN g/dL 3 6   TOTAL BILIRUBIN mg/dL 0 54       Results from last 7 days   Lab Units 01/17/23  0624 01/16/23  1848   GLUCOSE RANDOM mg/dL 100 105     Past Medical History:   Diagnosis Date   • Anesthesia complication     difficulty waking up   • Padilla esophagus    • Colon polyp    • COPD (chronic obstructive pulmonary disease) (HCC)    • COVID-19 11/03/2021   • Diverticulitis     colon resection   • Diverticulosis    • Hyperlipidemia    • Hypertension    • Wears glasses      Present on Admission:  • GI bleed  • Hypertension  • Dyslipidemia  • 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    Admitting Diagnosis: Rectal bleeding [K62 5]  Age/Sex: 63 y o  male  Admission Orders:  Clear liquids  Scd/foot pumps    Scheduled Medications:  atorvastatin, 20 mg, Oral, Daily With Dinner  losartan, 100 mg, Oral, Daily  methocarbamol, 500 mg, Oral, Q8H FRANKLYN  pantoprazole, 40 mg, Oral, Early Morning    PRN Meds:  acetaminophen, 650 mg, Oral, Q6H PRN    Network Utilization Review Department  ATTENTION: Please call with any questions or concerns to [phone] and carefully listen to the prompts so that you are directed to the right person  All voicemails are confidential   Shelly Day all requests for admission clinical reviews, approved or denied determinations and any other requests to dedicated fax number below belonging to the campus where the patient is receiving treatment   List of dedicated fax numbers for the Facilities:  [address] DENIALS (Administrative/Medical Necessity) [phone]   [address] (Maternity/NICU/Pediatrics) [phone]    Ada Ramirez [phone]   [facility] Fred  [phone]   [address] [place] [address] [place] [address] [phone]   1552 [facility] [place] [facility] 134 815 [facility] [phone]

## 2023-01-18 NOTE — ASSESSMENT & PLAN NOTE
Patient had outpatient EGD and colonoscopy and admitted as a direct admission for observation due to blood in the colon  Patient states that just prior to starting his bowel prep yesterday he had a bowel movement with some rectal bleeding  Continue to have blood with BMs but then his last 2 bowel movements this morning were clear  History of prior sigmoid resection  · Colonoscopy showed- patient was noted to have scattered diverticulosis, internal hemorrhoids, blood in the colon but no active bleeding noted  1 polyp was removed  · Possibly diverticular bleed  · HemoGlobin continues remained stable  · Patient was started on clear liquid diet which was advanced to regular diet  No further rectal bleeding

## 2023-01-18 NOTE — DISCHARGE SUMMARY
[address]  Discharge- Dea Bonilla 1960, 63 y o  male MRN: [id number]  Unit/Bed#: [address] [place] [address] Encounter: [id number]  Primary Care Provider: Antonio Potter MD   Date and time admitted to hospital: 1/16/2023  1:18 PM    * GI bleed  Assessment & Plan  Patient had outpatient EGD and colonoscopy and admitted as a direct admission for observation due to blood in the colon  Patient states that just prior to starting his bowel prep yesterday he had a bowel movement with some rectal bleeding  Continue to have blood with BMs but then his last 2 bowel movements this morning were clear  History of prior sigmoid resection  · Colonoscopy showed- patient was noted to have scattered diverticulosis, internal hemorrhoids, blood in the colon but no active bleeding noted  1 polyp was removed  · Possibly diverticular bleed  · HemoGlobin continues remained stable  · Patient was started on clear liquid diet which was advanced to regular diet  No further rectal bleeding      Dyslipidemia  Assessment & Plan  Continue statin    Hypertension  Assessment & Plan  Resume  Diovan upon discharge    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  Assessment & Plan  EGD yesterday showed antritis and short segment Padilla's  Continue Protonix daily        Medical Problems     Resolved Problems  Date Reviewed: 1/17/2023   None       Discharging Physician / Practitioner: Natalya Fernandez MD  PCP: Antonio Potter MD  Admission Date:   Admission Orders (From admission, onward)     Ordered        01/16/23 1525  Place in Observation  Once                      Discharge Date: 01/17/23    Outpatient Tests Requested:  · Follow-up with GI as needed    Complications: None    Reason for Admission: Priti Laming blood upon colonoscopy    Hospital Course:   Dea Bonilla is a 63 y o  male patient with past medical history of Padilla's esophagus, COPD, diverticulosis, hypertension, hyperlipidemia who originally presented to the hospital on 1/16/2023 due to blood noted upon colonoscopy  Patient underwent outpatient EGD and colonoscopy  Patient started having fantasma bleeding with bowel preparation  Patient also noticed to have fantasma blood on colonoscopy without any source identified  Patient admitted to hospital for observation  Hemoglobin continued to remain stable without any further rectal bleeding  Patient was started on clear liquid diet which was advanced to regular diet  Patient was discharged home with outpatient follow-up with GI  Please see above list of diagnoses and related plan for additional information  Condition at Discharge: stable    Discharge Day Visit / Exam:   Subjective: Patient has no further rectal bleeding  Denies any abdominal pain nausea or vomiting  Vitals: Blood Pressure: 127/72 (01/17/23 0700)  Pulse: 58 (01/17/23 0700)  Temperature: 97 7 °F (36 5 °C) (01/17/23 0700)  Temp Source: Tympanic (01/17/23 0700)  Respirations: 18 (01/17/23 0700)  Height: 5' 10" (177 8 cm) (01/16/23 1524)  SpO2: 97 % (01/17/23 0325)  Exam:   Physical Exam  Constitutional:       Appearance: Normal appearance  HENT:      Head: Normocephalic and atraumatic  Eyes:      Extraocular Movements: Extraocular movements intact  Pupils: Pupils are equal, round, and reactive to light  Cardiovascular:      Rate and Rhythm: Normal rate and regular rhythm  Heart sounds: No murmur heard  No gallop  Pulmonary:      Effort: Pulmonary effort is normal       Breath sounds: Normal breath sounds  Abdominal:      General: Bowel sounds are normal       Palpations: Abdomen is soft  Tenderness: There is no abdominal tenderness  Musculoskeletal:         General: No swelling or deformity  Normal range of motion  Cervical back: Normal range of motion and neck supple  Skin:     General: Skin is warm and dry  Neurological:      General: No focal deficit present  Mental Status: He is alert           Discharge instructions/Information to patient and family:   See after visit summary for information provided to patient and family  Provisions for Follow-Up Care:  See after visit summary for information related to follow-up care and any pertinent home health orders  Disposition:   Home    Planned Readmission: No     Discharge Statement:  I spent 25 minutes discharging the patient  This time was spent on the day of discharge  I had direct contact with the patient on the day of discharge  Greater than 50% of the total time was spent examining patient, answering all patient questions, arranging and discussing plan of care with patient as well as directly providing post-discharge instructions  Additional time then spent on discharge activities  Discharge Medications:  See after visit summary for reconciled discharge medications provided to patient and/or family        **Please Note: This note may have been constructed using a voice recognition system**

## 2023-01-19 NOTE — RESULT ENCOUNTER NOTE
Please inform patient that their biopsies were benign    Repeat EGD in 5 years due to intestinal metaplasia lower esophagus repeat colonoscopy in 7 years due to history of polyps

## 2023-01-27 PROBLEM — Z12.11 COLON CANCER SCREENING: Status: RESOLVED | Noted: 2022-11-28 | Resolved: 2023-01-27

## 2023-07-27 NOTE — H&P
[address]  H&P- Amanda Joya 1960, 63 y o  male MRN: [id number]  Unit/Bed#: [address] [place] [address] Encounter: [id number]  Primary Care Provider: Kayla Yao MD   Date and time admitted to hospital: 1/16/2023  1:18 PM    * GI bleed  Assessment & Plan  Patient had outpatient EGD and colonoscopy and admitted as a direct admission for observation due to blood in the colon  Patient states that just prior to starting his bowel prep yesterday he had a bowel movement with some rectal bleeding  Continue to have blood with BMs but then his last 2 bowel movements this morning were clear  History of prior sigmoid resection  · On colonoscopy today patient was noted to have scattered diverticulosis, internal hemorrhoids, blood in the colon but no active bleeding noted  1 polyp was removed  · Possibly diverticular bleed  · CBC was done with stable hemoglobin  · Rpt H/H in 6 hours, sooner if patient has active bleeding  · Discussed with GI who recommended clear liquid diet, monitoring overnight and if patient has active bleeding consult GI and obtain high-volume CTA  · Discussed with patient to notify us if he has any further active bleeding    Dyslipidemia  Assessment & Plan  Continue statin    Hypertension  Assessment & Plan  Diovan substituted with Cozaar while here  Monitor blood pressure    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  Assessment & Plan  EGD today showed antritis and short segment Padilla's  Protonix daily    VTE Pharmacologic Prophylaxis:   none  Code Status: Level 1 - Full Code   Discussion with family: Updated  (wife) at bedside  Anticipated Length of Stay: Patient will be admitted on an observation basis with an anticipated length of stay of less than 2 midnights secondary to GI bleed with blood in the colon on colonoscopy      Total Time for Visit, including Counseling / Coordination of Care: 45 minutes Greater than 50% of this total time spent on direct patient counseling and coordination of care  Chief Complaint: Blood in the colon    History of Present Illness:  Yuni Baig is a 63 y o  male who presents as a direct admit after colonoscopy revealed blood in the colon  Patient underwent outpatient EGD and colonoscopy today  Patient reports that he had a bowel movement yesterday just prior to starting the prep with rectal bleeding  He then continued to have blood with bowel movements until this morning when his last 2 bowel movements were clear  Denies any abdominal pain    Review of Systems:  Review of Systems   Constitutional: Positive for chills  Negative for appetite change and fever  HENT: Negative for congestion  Eyes: Negative for photophobia and visual disturbance  Respiratory: Negative for cough, chest tightness and shortness of breath  Cardiovascular: Negative for chest pain and leg swelling  Gastrointestinal: Positive for anal bleeding  Negative for abdominal pain, diarrhea, nausea and vomiting  Genitourinary: Negative for dysuria, frequency and hematuria  Musculoskeletal: Positive for neck pain and neck stiffness  Skin: Negative for wound  Neurological: Negative for dizziness and light-headedness  Hematological: Does not bruise/bleed easily  Psychiatric/Behavioral: Negative for agitation  Past Medical and Surgical History:   Past Medical History:   Diagnosis Date   • Anesthesia complication     difficulty waking up   • Padilla esophagus    • Colon polyp    • COPD [facility]chronic obstructive pulmonary disease) ([facility] )    • COVID-19 11/03/2021   • Diverticulitis     colon resection   • Diverticulosis    • Hyperlipidemia    • Hypertension    • Wears glasses        Past Surgical History:   Procedure Laterality Date   • BICEPS TENDON REPAIR     • BOWEL RESECTION      due to diverticulitis   • COLONOSCOPY     • HERNIA REPAIR         Meds/Allergies:  Prior to Admission medications    Medication Sig Start Date End Date Taking? Authorizing Provider   atorvastatin (LIPITOR) 20 mg tablet Take 20 mg by mouth daily   Yes Historical Provider, MD   Black Pepper-Turmeric (TURMERIC COMPLEX/BLACK PEPPER PO) Take 2,000 mg by mouth in the morning Last dose 23   Yes Historical Provider, MD   Coenzyme Q10 (Co Q 10) 100 MG CAPS Take by mouth in the morning   Yes Historical Provider, MD   multivitamin (THERAGRAN) TABS Take 1 tablet by mouth daily Men 50 plus   Yes Historical Provider, MD   omeprazole (PriLOSEC) 20 mg delayed release capsule Take 1 capsule (20 mg total) by mouth daily Take 1/2 hour prior to breakfast 22 Yes BALJEET Washington   valsartan (DIOVAN) 160 mg tablet Take 160 mg by mouth every morning   Yes Historical Provider, MD   polyethylene glycol (GOLYTELY) 4000 mL solution Take 4,000 mL by mouth once for 1 dose 22  BALJEET [place]   sildenafil (VIAGRA) 100 mg tablet Take 100 mg by mouth as needed 10/3/22   Historical Provider, MD     I have reviewed home medications using recent Epic encounter  Allergies: Allergies   Allergen Reactions   • Nsaids Hives     Reaction Date: 2012;          Social History:  Marital Status: /Civil Union   Patient Pre-hospital Living Situation: With spouse  Patient Pre-hospital Level of Mobility: walks  Patient Pre-hospital Diet Restrictions: none  Substance Use History:   Social History     Substance and Sexual Activity   Alcohol Use Yes    Comment: occ beer     Social History     Tobacco Use   Smoking Status Former   • Packs/day: 1 00   • Types: Cigarettes   • Quit date: 2018   • Years since quittin 0   Smokeless Tobacco Never     Social History     Substance and Sexual Activity   Drug Use Not Currently   • Types: Marijuana       Family History:  Family History   Problem Relation Age of Onset   • Esophageal cancer Sister        Physical Exam:     Vitals:   Height: 5' 10" (177 8 cm) (23 1524)    Physical Exam  Vitals reviewed  Constitutional:       General: He is not in acute distress  Appearance: He is not ill-appearing  HENT:      Head: Normocephalic and atraumatic  Eyes:      General:         Right eye: No discharge  Left eye: No discharge  Cardiovascular:      Rate and Rhythm: Normal rate and regular rhythm  Pulmonary:      Effort: Pulmonary effort is normal  No respiratory distress  Breath sounds: Normal breath sounds  No wheezing or rales  Abdominal:      General: Bowel sounds are normal  There is no distension  Palpations: Abdomen is soft  Tenderness: There is no abdominal tenderness  Musculoskeletal:      Right lower leg: No edema  Left lower leg: No edema  Neurological:      Mental Status: He is alert and oriented to person, place, and time  Additional Data:     Lab Results:  Results from last 7 days   Lab Units 01/16/23  1116   WBC Thousand/uL 7 45   HEMOGLOBIN g/dL 13 7   HEMATOCRIT % 40 9   PLATELETS Thousands/uL 249   NEUTROS PCT % 68   LYMPHS PCT % 24   MONOS PCT % 6   EOS PCT % 1                           Lines/Drains:  Invasive Devices     Peripheral Intravenous Line  Duration           Peripheral IV 11/06/21 Right Forearm 436 days    Peripheral IV 01/16/23 Dorsal (posterior); Right Hand <1 day                    Imaging: Reviewed procedure notes  No orders to display       EKG and Other Studies Reviewed on Admission:   · EKG: no ekg obtained    ** Please Note: This note has been constructed using a voice recognition system   ** Abdominal Pain, N/V/D

## 2023-12-19 DIAGNOSIS — K21.9 GASTROESOPHAGEAL REFLUX DISEASE WITHOUT ESOPHAGITIS: ICD-10-CM

## 2023-12-19 RX ORDER — OMEPRAZOLE 20 MG/1
CAPSULE, DELAYED RELEASE ORAL
Qty: 90 CAPSULE | Refills: 0 | Status: SHIPPED | OUTPATIENT
Start: 2023-12-19

## 2024-03-12 DIAGNOSIS — K21.9 GASTROESOPHAGEAL REFLUX DISEASE WITHOUT ESOPHAGITIS: ICD-10-CM

## 2024-03-12 RX ORDER — OMEPRAZOLE 20 MG/1
CAPSULE, DELAYED RELEASE ORAL
Qty: 30 CAPSULE | Refills: 0 | Status: SHIPPED | OUTPATIENT
Start: 2024-03-12

## 2024-05-09 ENCOUNTER — TELEPHONE (OUTPATIENT)
Age: 64
End: 2024-05-09

## 2024-05-09 NOTE — TELEPHONE ENCOUNTER
Patients GI provider:  BALJEET Shelby     Number to return call: [phone]    Reason for call: Rosemarie from [employer] who does prior authorizations called asking if the omeprazole capsule or tablet form.    Scheduled procedure/appointment date if applicable: 7/30/2024

## 2024-05-10 NOTE — TELEPHONE ENCOUNTER
PA for omeprazole capsue RESUBMITTED     Submitted via    []CMM-KEY   [x]Leslie-Case ID #   Case ID: [id number]   Fax: [phone]     []Faxed to plan   []Other website   []Phone call Case ID #     Office notes sent, clinical questions answered. Awaiting determination    Turnaround time for your insurance to make a decision on your Prior Authorization can take 7-21 business days.

## 2024-05-10 NOTE — TELEPHONE ENCOUNTER
Script is for capsules, looking up key [id number]  in CMM the pa the GI office submitted was for tablets.

## 2024-05-14 NOTE — TELEPHONE ENCOUNTER
PA for omeprozole  Denied    Reason:(        Message sent to office clinical pool Yes    Denial letter scanned into Media No      Appeal started No ( Provider will need to decide if appeal is warranted and send clinical documentation to PA team for initiation.)    **Please follow up with your patient regarding denial and next steps**

## 2024-05-24 ENCOUNTER — TELEPHONE (OUTPATIENT)
Age: 64
End: 2024-05-24

## 2024-05-24 ENCOUNTER — TELEPHONE (OUTPATIENT)
Dept: GASTROENTEROLOGY | Facility: CLINIC | Age: 64
End: 2024-05-24

## 2024-05-28 DIAGNOSIS — K21.9 GASTROESOPHAGEAL REFLUX DISEASE WITHOUT ESOPHAGITIS: ICD-10-CM

## 2024-05-29 ENCOUNTER — TELEPHONE (OUTPATIENT)
Age: 64
End: 2024-05-29

## 2024-05-29 RX ORDER — OMEPRAZOLE 20 MG/1
CAPSULE, DELAYED RELEASE ORAL
Qty: 30 CAPSULE | Refills: 1 | Status: SHIPPED | OUTPATIENT
Start: 2024-05-29

## 2024-05-29 NOTE — TELEPHONE ENCOUNTER
PA for omeprazole    Submitted via    []CMM-KEY   [x]Surescrifermín-Case ID #   Case ID: [id number]   Fax: [phone]     []Faxed to plan   []Other website   []Phone call Case ID #     Office notes sent, clinical questions answered. Awaiting determination    Turnaround time for your insurance to make a decision on your Prior Authorization can take 7-21 business days.

## 2024-05-30 NOTE — TELEPHONE ENCOUNTER
Denied    Note from payer: Details of this decision are provided on the physician outcome notice which has been faxed to the number on file.  Payer: [employer] [facility] Case ID: [id number]    [phone]    [phone]  Electronic appeal: Not supported  View History  Medication Being Authorized    omeprazole (PriLOSEC) 20 mg delayed release capsule  take 1 capsule by mouth once daily TAKE 1/2 HOUR BEFORE BREAKFAST  Dispense: 30 capsule Refills: 1   Start: 5/29/2024   Class: Normal Diagnoses: Gastroesophageal reflux disease without esophagitis     PA for omeprazole Denied    Reason:(Screenshot if applicable)    No letter recvd as of yet    Message sent to office clinical pool Yes    Denial letter NOT  into Media No letter recvd    Appeal started No ( Provider will need to decide if appeal is warranted and send clinical documentation to PA team for initiation.)    **Please follow up with your patient regarding denial and next steps**

## 2024-05-31 NOTE — TELEPHONE ENCOUNTER
Please call and inform patient that his medication for omeprazole was denied by his insurance.  Please find out if he is having any symptoms of acid reflux or heartburn if so I could send in a prescription for famotidine 20 mg 2 times a day and see if his insurance will cover medication.Thank you

## 2024-07-26 ENCOUNTER — TELEPHONE (OUTPATIENT)
Age: 64
End: 2024-07-26

## 2024-07-26 DIAGNOSIS — K21.9 GASTROESOPHAGEAL REFLUX DISEASE WITHOUT ESOPHAGITIS: ICD-10-CM

## 2024-07-26 RX ORDER — OMEPRAZOLE 20 MG/1
CAPSULE, DELAYED RELEASE ORAL
Qty: 30 CAPSULE | Refills: 0 | Status: SHIPPED | OUTPATIENT
Start: 2024-07-26 | End: 2024-07-30 | Stop reason: SDUPTHER

## 2024-07-26 NOTE — TELEPHONE ENCOUNTER
PA for omerpazole    Submitted via    []CMM-KEY   [x]Leslie-Case ID #     [id number]    Payer: [employer] [facility]   Phone: [phone]   Fax: [phone]     []Faxed to plan   []Other website   []Phone call Case ID #     Office notes sent, clinical questions answered. Awaiting determination    Turnaround time for your insurance to make a decision on your Prior Authorization can take 7-21 business days.

## 2024-07-27 NOTE — TELEPHONE ENCOUNTER
Approved    Prior authorization approved  Payer: [facility] Case ID: [id number]    [phone]  Note from payer: The case has been Approved from   to  Electronic appeal: Not supported  View History  Medication Being Authorized    omeprazole (PriLOSEC) 20 mg delayed release capsule  take 1 capsule by mouth once daily TAKE 1/2 HOUR BEFORE BREAKFAST  Dispense: 30 capsule Refills: 0   Start: 7/26/2024   Class: Normal Diagnoses: Gastroesophageal reflux disease without esophagitis   This order has been released to its destination.  [id number] [address] for omeprazole Approved     Date(s) approved (no approval dates given)    Case #[id number]       Patient advised by          [] [employer] Message  [] Phone call   []LMOM  [x]L/M to call office as no active Communication consent on file  []Unable to leave detailed message as VM not approved on Communication consent       Pharmacy advised by    [x]Fax  []Phone call    Approval letter NOT scanned into Media no letter at time of approval

## 2024-07-30 ENCOUNTER — OFFICE VISIT (OUTPATIENT)
Dept: GASTROENTEROLOGY | Facility: CLINIC | Age: 64
End: 2024-07-30
Payer: COMMERCIAL

## 2024-07-30 VITALS
SYSTOLIC BLOOD PRESSURE: 129 MMHG | HEART RATE: 67 BPM | WEIGHT: 193 LBS | BODY MASS INDEX: 27.02 KG/M2 | HEIGHT: 71 IN | DIASTOLIC BLOOD PRESSURE: 68 MMHG

## 2024-07-30 DIAGNOSIS — K22.70 BARRETT'S ESOPHAGUS WITHOUT DYSPLASIA: ICD-10-CM

## 2024-07-30 DIAGNOSIS — K21.9 GASTROESOPHAGEAL REFLUX DISEASE WITHOUT ESOPHAGITIS: ICD-10-CM

## 2024-07-30 DIAGNOSIS — Z86.010 HX OF COLONIC POLYPS: Primary | ICD-10-CM

## 2024-07-30 PROCEDURE — 99214 OFFICE O/P EST MOD 30 MIN: CPT | Performed by: NURSE PRACTITIONER

## 2024-07-30 RX ORDER — OMEPRAZOLE 20 MG/1
20 CAPSULE, DELAYED RELEASE ORAL DAILY
Qty: 90 CAPSULE | Refills: 3 | Status: SHIPPED | OUTPATIENT
Start: 2024-07-30 | End: 2025-07-25

## 2024-07-30 NOTE — PATIENT INSTRUCTIONS
"Patient Education     High-fiber diet   The Basics   Written by the doctors and editors at [facility]   What is fiber? -- Fiber is a substance found in some fruits, vegetables, and grains. Most fiber passes through your body without being digested. But it can affect how you digest other foods, and it can also improve your bowel movements.  There are 2 kinds of fiber. One kind is called \"soluble fiber\" and is found in fruits, oats, barley, beans, and peas. The other kind is called \"insoluble fiber,\" and is found in wheat, rye, and other grains.  Both kinds of fiber that you eat are called \"dietary fiber.\"  Why is fiber important to my health? -- Fiber can help make your bowel movements softer and more regular. Adding fiber to your diet can help with problems including constipation, hemorrhoids, and diarrhea. Plus, it can help prevent \"accidents\" if you have trouble controlling your bowel movements.  Getting enough fiber can also help lower your risk of heart disease, stroke, and type 2 diabetes. That's because fiber can help lower cholesterol and help control blood sugar.  How much fiber do I need? -- The recommended amount of fiber is 20 to 35 grams a day. The nutrition label on packaged foods can show you how much fiber you are getting in each serving (figure 1).  How can I make sure I'm getting enough fiber? -- To make sure that you're getting enough fiber, eat plenty of the fruits, vegetables, and grains that contain fiber (table 1 and figure 2). Many breakfast cereals also have a lot of fiber.  If you can't get enough fiber from food, you can add wheat bran to the foods you do eat. Or you can take fiber supplements. These come in the form of powders, wafers, or pills. They include psyllium seed (sample brand names: Metamucil, Konsyl), methylcellulose (sample brand name: Citrucel), polycarbophil (sample brand name: FiberCon), and wheat dextrin (sample brand name: Benefiber). If you take a fiber supplement, be sure " "to read the label so you know how much to take. If you're not sure, ask your doctor or nurse.  What are the side effects of fiber? -- When you start eating more fiber, your belly might feel bloated, or you might have gas or cramps. You can avoid these side effects by adding fiber to your diet slowly.  Some people feel worse when they eat more fiber or take fiber supplements. If you feel worse after adding more fiber to your diet, you can try decreasing the amount of fiber to see if that helps.  All topics are updated as new evidence becomes available and our peer review process is complete.  This topic retrieved from [employer] on: Feb 28, 2024.  Topic 05785 Version 10.0  Release: 32.2.4 - C32.58  © 2024 UpToDate, Inc. and/or its affiliates. All rights reserved.  figure 1: Nutrition label - Fiber     This is an example of a nutrition label. To figure out how much fiber is in a food, look for the line that says \"Dietary Fiber.\" It's also important to look at the serving size. This food has 7 grams of fiber in each serving, and each serving is 1 cup.  Graphic 42984 Version 8.0  table 1: Amount of fiber in different foods  Food  Serving  Grams of fiber    Fruits    Apple (with skin) 1 medium apple 4.4   Banana 1 medium banana 3.1   Oranges 1 orange 3.1   Prunes 1 cup, pitted 12.4   Juices    Apple, unsweetened, with added ascorbic acid 1 cup 0.5   Grapefruit, white, canned, sweetened 1 cup 0.2   Grape, unsweetened, with added ascorbic acid 1 cup 0.5   Orange 1 cup 0.7   Vegetables    Cooked   Green beans 1 cup 4.0   Carrots 1/2 cup sliced 2.3   Peas 1 cup 8.8   Potato (baked, with skin) 1 medium potato 3.8   Raw   Cucumber (with peel) 1 cucumber 1.5   Lettuce 1 cup shredded 0.5   Tomato 1 medium tomato 1.5   Spinach 1 cup 0.7   Legumes   Baked beans, canned, no salt added 1 cup 13.9   Kidney beans, canned 1 cup 13.6   Lima beans, canned 1 cup 11.6   Lentils, boiled 1 cup 15.6   Breads, pastas, flours    Bran muffins 1 " medium muffin 5.2   Oatmeal, cooked 1 cup 4.0   White bread 1 slice 0.6   Whole-wheat bread 1 slice 1.9   Pasta and rice, cooked   Macaroni 1 cup 2.5   Rice, brown 1 cup 3.5   Rice, white 1 cup 0.6   Spaghetti (regular) 1 cup 2.5   Nuts    Almonds 1/2 cup 8.7   Peanuts 1/2 cup 7.9   To learn how much fiber and other nutrients are in different foods, visit the [place] Department of Agriculture ([employer]) FoodData Central website.  Graphic 88960 Version 6.0  figure 2: Foods with fiber     Foods with a lot of fiber include prunes, apples, oranges, bananas, peas, green beans, kidney beans, cooked oatmeal, almonds, peanuts, and whole-wheat bread.  Graphic 69973 Version 1.0  Consumer Information Use and Disclaimer   Disclaimer: This generalized information is a limited summary of diagnosis, treatment, and/or medication information. It is not meant to be comprehensive and should be used as a tool to help the user understand and/or assess potential diagnostic and treatment options. It does NOT include all information about conditions, treatments, medications, side effects, or risks that may apply to a specific patient. It is not intended to be medical advice or a substitute for the medical advice, diagnosis, or treatment of a health care provider based on the health care provider's examination and assessment of a patient's specific and unique circumstances. Patients must speak with a health care provider for complete information about their health, medical questions, and treatment options, including any risks or benefits regarding use of medications. This information does not endorse any treatments or medications as safe, effective, or approved for treating a specific patient. UpToDate, Inc. and its affiliates disclaim any warranty or liability relating to this information or the use thereof.The use of this information is governed by the Terms of Use, available at  https://www.woltersMadeleine Marketuwer.com/en/know/clinical-effectiveness-terms. 2024© [employer] and its affiliates and/or licensors. All rights reserved.  Copyright   © 2024 [employer] and/or its affiliates. All rights reserved.

## 2024-07-31 ENCOUNTER — TELEPHONE (OUTPATIENT)
Age: 64
End: 2024-07-31

## 2024-07-31 NOTE — TELEPHONE ENCOUNTER
PA for omeprazole 20 SUBMITTED     via    []CMM-KEY   [x]Leslie-Case ID #     [id number]    Payer: [facility]   Phone: [phone]   Fax: [phone]     []Faxed to plan   []Other website   []Phone call Case ID #     Office notes sent, clinical questions answered. Awaiting determination    Turnaround time for your insurance to make a decision on your Prior Authorization can take 7-21 business days.

## 2024-10-22 ENCOUNTER — APPOINTMENT (OUTPATIENT)
Dept: RADIOLOGY | Facility: CLINIC | Age: 64
End: 2024-10-22
Payer: COMMERCIAL

## 2024-10-22 DIAGNOSIS — M79.641 RIGHT HAND PAIN: ICD-10-CM

## 2024-10-22 PROCEDURE — 73130 X-RAY EXAM OF HAND: CPT

## 2024-11-16 ENCOUNTER — HOSPITAL ENCOUNTER (OUTPATIENT)
Dept: RADIOLOGY | Facility: HOSPITAL | Age: 64
Discharge: HOME | End: 2024-11-16

## 2024-11-16 DIAGNOSIS — G44.53 PRIMARY THUNDERCLAP HEADACHE: ICD-10-CM

## 2024-11-16 DIAGNOSIS — R51.9 NONINTRACTABLE HEADACHE, UNSPECIFIED CHRONICITY PATTERN, UNSPECIFIED HEADACHE TYPE: ICD-10-CM

## 2024-11-17 ENCOUNTER — HOSPITAL ENCOUNTER (OUTPATIENT)
Dept: RADIOLOGY | Facility: HOSPITAL | Age: 64
Discharge: HOME | End: 2024-11-17
Payer: COMMERCIAL

## 2024-11-17 PROCEDURE — 70551 MRI BRAIN STEM W/O DYE: CPT

## 2024-11-26 ENCOUNTER — OFFICE VISIT (OUTPATIENT)
Dept: OBGYN CLINIC | Facility: CLINIC | Age: 64
End: 2024-11-26
Payer: COMMERCIAL

## 2024-11-26 VITALS
DIASTOLIC BLOOD PRESSURE: 81 MMHG | SYSTOLIC BLOOD PRESSURE: 129 MMHG | WEIGHT: 192.8 LBS | HEART RATE: 72 BPM | BODY MASS INDEX: 26.99 KG/M2 | HEIGHT: 71 IN

## 2024-11-26 DIAGNOSIS — M65.321 TRIGGER FINGER, RIGHT INDEX FINGER: Primary | ICD-10-CM

## 2024-11-26 PROCEDURE — 99204 OFFICE O/P NEW MOD 45 MIN: CPT | Performed by: STUDENT IN AN ORGANIZED HEALTH CARE EDUCATION/TRAINING PROGRAM

## 2024-11-26 NOTE — PROGRESS NOTES
ORTHOPAEDIC HAND, WRIST, AND ELBOW OFFICE  VISIT     ASSESSMENT/PLAN:    Александр Perez is a 64 y.o. RHD male who presents with right index finger trigger finger s/p CSI 11/26/24    - We discussed the natural history and etiology of this condition detail. We discussed the utility of therapy vs. injection vs. surgery. In this case, the risks of surgery involve infection, persistent pain, and recurrence of the condition.       -  The patient elected to receive a steroid injection as per procedure note below.      - The patient will follow up with us on a as needed basis should the symptoms worsen or persist.            The patient verbalized understanding of exam findings and treatment plan. We engaged in the shared decision-making process and treatment options were discussed at length with the patient. Surgical and conservative management discussed today along with risks and benefits.    Follow Up:  3 months or PRN      General Discussions:  Trigger Finger: The anatomy and physiology of trigger finger was discussed with the patient today in the office.  Edema and increased contact pressure within the flexor tendons at the A1 pulley can cause pain, crepitation, and triggering or locking of the digit resulting in limitation of function.  Symptoms can occur at anytime but are typically worse in the morning or after a brief rest from repetitive activity.  Treatment options include resting/nighttime MP blocking splints to decrease edema, oral anti-inflammatory medications, home or formal therapy exercises, up to 2 steroid injections within the tendon sheath, or surgical release.  While majority of patients do respond to conservative treatment, up to 20% may require surgical release.     Operative Discussions:  Trigger Finger Release: The anatomy and physiology of trigger finger was discussed with the patient today in the office.  Edema and increased contact pressure within the flexor tendons at the A1 pulley can cause  pain, crepitation, and limitation of function.  Treatment options include resting MP blocking splints to decrease edema, oral anti-inflammatory medications, home or formal therapy exercises, up to 2 steroid injections or surgical release.  While majority of patients do respond to conservative treatment, up to 20% may require surgical release.  The patient has elected release of the trigger finger.  The patient has elected to undergo a release of the A1 pulley (trigger finger).  A small incision will be made over the palmar aspect of the hand, the tendon sheath holding the flexor tendons will be released.  In the postoperative period, light activities are allowed immediately, driving is allowed when narcotic medication has stopped, and the incision may get wet after 2 days.  Heavy activities (lifting more than approximately 10 pounds) will be allowed after the follow up appointment in 1-2 weeks.  While the pain and discomfort within the wrist typically improves rapidly, some residual discomfort may be present for up to 6 weeks.  The nodule that is typically palpable in the palmar aspect of the hand will not be removed, as this would necessitate removal of a portion of the flexor tendon, however the catching, clicking, and locking should resolve.  Approximate success rate is 98%.The risks and benefits of the procedure were explained to the patient, which include, but are not limited to: Bleeding, infection, recurrence, pain, scar, damage to tendons, damage to nerves, and damage to blood vessels, need for future surgery and complications related to anesthesia.  If bony work is done, risks also include malunion and nonunion.  These risks, along with alternative conservative treatment options, and postoperative protocols were voiced back and understood by the patient.  All questions were answered to the patient's satisfaction.  The patient agrees to comply with a standard postoperative protocol, and is willing to  proceed.  Education was provided via written and auditory forms.  There were no barriers to learning. Written handouts regarding wound care, incision and scar care, and general preoperative information, as well as risks and benefits were provided to the patient.    ____________________________________________________________________________________________________________________________________________      CHIEF COMPLAINT:  Right trigger index finger    SUBJECTIVE:  Александр Perez is a 64 y.o. male who presents with right index finger pain that began 4 months ago. He reports the right index finger sometimes pops-up into extension after being ulices tight  or flexed position. He has been out of work for the past 3 weeks, which seems to have helped calm his inflammation. He did take a medrol dosepak prescribed by his PCP which helped temporarily.  Radiation: None  Previous Treatments: Tylenol and activity modification with only partial relief  Associated symptoms: None  Handedness: right  Work status: construction    I have personally reviewed all the relevant PMH, PSH, SH, FH, Medications and allergies      PAST MEDICAL HISTORY:  Past Medical History:   Diagnosis Date    Anesthesia complication     difficulty waking up    Padilla esophagus     Colon polyp     COPD (chronic obstructive pulmonary disease) ([facility])     COVID-19 2021    Diverticulitis     colon resection    Diverticulosis     Hyperlipidemia     Hypertension     Wears glasses        PAST SURGICAL HISTORY:  Past Surgical History:   Procedure Laterality Date    BICEPS TENDON REPAIR      BOWEL RESECTION      due to diverticulitis    COLONOSCOPY      HERNIA REPAIR         FAMILY HISTORY:  Family History   Problem Relation Age of Onset    Esophageal cancer Sister        SOCIAL HISTORY:  Social History     Tobacco Use    Smoking status: Former     Current packs/day: 0.00     Types: Cigarettes     Quit date: 2018     Years since quittin.8     "Smokeless tobacco: Never   Vaping Use    Vaping status: Never Used   Substance Use Topics    Alcohol use: Yes     Comment: occ beer    Drug use: Not Currently     Types: Marijuana       MEDICATIONS:    Current Outpatient Medications:     atorvastatin (LIPITOR) 20 mg tablet, Take 20 mg by mouth daily, Disp: , Rfl:     Black Pepper-Turmeric (TURMERIC COMPLEX/BLACK PEPPER PO), Take 2,000 mg by mouth in the morning Last dose 1/11/23, Disp: , Rfl:     Coenzyme Q10 (Co Q 10) 100 MG CAPS, Take by mouth in the morning, Disp: , Rfl:     multivitamin (THERAGRAN) TABS, Take 1 tablet by mouth daily Men 50 plus, Disp: , Rfl:     omeprazole (PriLOSEC) 20 mg delayed release capsule, Take 1 capsule (20 mg total) by mouth daily, Disp: 90 capsule, Rfl: 3    polyethylene glycol (GOLYTELY) 4000 mL solution, Take 4,000 mL by mouth once for 1 dose, Disp: 4000 mL, Rfl: 0    sildenafil (VIAGRA) 100 mg tablet, Take 100 mg by mouth as needed, Disp: , Rfl:     valsartan (DIOVAN) 160 mg tablet, Take 160 mg by mouth every morning, Disp: , Rfl:     ALLERGIES:  Allergies   Allergen Reactions    Nsaids Hives     Reaction Date: 27Feb2012;              REVIEW OF SYSTEMS:  Pertinent items are noted in HPI.  A comprehensive review of systems was negative.    VITALS:  There were no vitals filed for this visit.    LABS:  HgA1c: No results found for: \"HGBA1C\"  BMP:   Lab Results   Component Value Date    CALCIUM 8.7 01/17/2023    K 4.1 01/17/2023    CO2 25 01/17/2023     01/17/2023    BUN 15 01/17/2023    CREATININE 1.12 01/17/2023       _____________________________________________________  PHYSICAL EXAMINATION:  General: well developed and well nourished, alert, oriented times 3, and appears comfortable  Psychiatric: Normal  HEENT: Normocephalic, Atraumatic Trachea Midline, No torticollis  Pulmonary: No audible wheezing or respiratory distress   Abdomen/GI: Non tender, non distended   Cardiovascular: No pitting edema, 2+ radial pulse   Skin: No " "masses, erythema, lacerations, fluctation, ulcerations  Neurovascular: Sensation Intact to the Median, Ulnar, Radial Nerve, Motor Intact to the Median, Ulnar, Radial Nerve, and Pulses Intact  Musculoskeletal: Normal, except as noted in detailed exam and in HPI.        FOCUSED MUSCULOSKELETAL EXAMINATION:  Right Upper Extremity  Inspection: skin intact, no notable deformity   Palpation: ttp at A1 pulley of right index finger  Neurologic: 5/5 elbow flexion, 5/5 elbow extension, 5/5 wrist extension, 5/5 wrist flexion, 5/5 finger flexion, 5/5 finger extension, 5/5 FPL, 5/5 EPL, 5/5 APB, 5/5 intrinsics, sensation intact to median, radial, and ulnar nerve distributions  Vascular: Palpable radial pulse, brisk cap refill <2sec, hand warm and well perfused  MSK:   Mild clicking with range of motion of right index finger   ___________________________________________________  STUDIES REVIEWED:  Xrays of the right hand were obtained on 10/22/24 were independently reviewed which demonstrates no acute fracture or dislocation    LABS REVIEWED:    HgA1c: No results found for: \"HGBA1C\"  BMP:   Lab Results   Component Value Date    CALCIUM 8.7 01/17/2023    K 4.1 01/17/2023    CO2 25 01/17/2023     01/17/2023    BUN 15 01/17/2023    CREATININE 1.12 01/17/2023               PROCEDURES PERFORMED:  Procedures  No Procedures performed today    _____________________________________________________      I agree with the history, physical examination, assessment and plan of care as documented above.    Huan Mujica M.D.  Attending, Orthopaedic Surgery  Hand, Wrist, and Elbow Surgery  [facility]     "

## 2025-02-04 ENCOUNTER — TELEPHONE (OUTPATIENT)
Age: 65
End: 2025-02-04

## 2025-02-04 NOTE — TELEPHONE ENCOUNTER
Lvm to patient. Advised that we can make an appointment 2/14 for CSI, provided call back number.

## 2025-02-04 NOTE — TELEPHONE ENCOUNTER
Caller: Александр    Doctor: Dr. Mujica    Reason for call: patient had a CSI on 11/26/24. He would like to know if he would be able to get another one a little earlier than the 3 months. He is asking for it to be on 2/17/25 as he is leaving the area.  Please advise the patient  Thank you  Call back#: [phone] please leave a VM is unable to get an answer

## 2025-02-14 DIAGNOSIS — G56.01 CARPAL TUNNEL SYNDROME ON RIGHT: ICD-10-CM

## 2025-02-14 DIAGNOSIS — M65.321 TRIGGER FINGER, RIGHT INDEX FINGER: Primary | ICD-10-CM

## 2025-02-14 PROCEDURE — 99213 OFFICE O/P EST LOW 20 MIN: CPT | Performed by: STUDENT IN AN ORGANIZED HEALTH CARE EDUCATION/TRAINING PROGRAM

## 2025-02-14 PROCEDURE — 20550 NJX 1 TENDON SHEATH/LIGAMENT: CPT | Performed by: STUDENT IN AN ORGANIZED HEALTH CARE EDUCATION/TRAINING PROGRAM

## 2025-02-14 RX ORDER — BETAMETHASONE SODIUM PHOSPHATE AND BETAMETHASONE ACETATE 3; 3 MG/ML; MG/ML
6 INJECTION, SUSPENSION INTRA-ARTICULAR; INTRALESIONAL; INTRAMUSCULAR; SOFT TISSUE
Status: COMPLETED | OUTPATIENT
Start: 2025-02-14 | End: 2025-02-14

## 2025-02-14 RX ORDER — LIDOCAINE HYDROCHLORIDE 10 MG/ML
1 INJECTION, SOLUTION INFILTRATION; PERINEURAL
Status: COMPLETED | OUTPATIENT
Start: 2025-02-14 | End: 2025-02-14

## 2025-02-14 RX ADMIN — BETAMETHASONE SODIUM PHOSPHATE AND BETAMETHASONE ACETATE 6 MG: 3; 3 INJECTION, SUSPENSION INTRA-ARTICULAR; INTRALESIONAL; INTRAMUSCULAR; SOFT TISSUE at 08:15

## 2025-02-14 RX ADMIN — LIDOCAINE HYDROCHLORIDE 1 ML: 10 INJECTION, SOLUTION INFILTRATION; PERINEURAL at 08:15

## 2025-02-14 NOTE — PROGRESS NOTES
ORTHOPAEDIC HAND, WRIST, AND ELBOW OFFICE  VISIT     ASSESSMENT/PLAN:    Александр Perez is a 64 y.o. RHD male who presents with  Right carpal tunnel syndrome   right index finger trigger finger s/p CSI 11/26/24, 2/14/25    - We discussed the natural history and etiology of this condition detail. We discussed the utility of therapy vs. injection vs. surgery. In this case, the risks of surgery involve infection, persistent pain, and recurrence of the condition.       -  The patient elected to receive a second steroid injection as per procedure note below, discussed that if persistent locking in 3 months will consider possible surgical release.    - Carpal tunnel syndrome responding to nighttime splinting, monitor symptoms.             The patient verbalized understanding of exam findings and treatment plan. We engaged in the shared decision-making process and treatment options were discussed at length with the patient. Surgical and conservative management discussed today along with risks and benefits.    Follow Up:  3 months       General Discussions:  Trigger Finger: The anatomy and physiology of trigger finger was discussed with the patient today in the office.  Edema and increased contact pressure within the flexor tendons at the A1 pulley can cause pain, crepitation, and triggering or locking of the digit resulting in limitation of function.  Symptoms can occur at anytime but are typically worse in the morning or after a brief rest from repetitive activity.  Treatment options include resting/nighttime MP blocking splints to decrease edema, oral anti-inflammatory medications, home or formal therapy exercises, up to 2 steroid injections within the tendon sheath, or surgical release.  While majority of patients do respond to conservative treatment, up to 20% may require surgical release.     Operative Discussions:  Trigger Finger Release: The anatomy and physiology of trigger finger was discussed with the patient  today in the office.  Edema and increased contact pressure within the flexor tendons at the A1 pulley can cause pain, crepitation, and limitation of function.  Treatment options include resting MP blocking splints to decrease edema, oral anti-inflammatory medications, home or formal therapy exercises, up to 2 steroid injections or surgical release.  While majority of patients do respond to conservative treatment, up to 20% may require surgical release.  The patient has elected release of the trigger finger.  The patient has elected to undergo a release of the A1 pulley (trigger finger).  A small incision will be made over the palmar aspect of the hand, the tendon sheath holding the flexor tendons will be released.  In the postoperative period, light activities are allowed immediately, driving is allowed when narcotic medication has stopped, and the incision may get wet after 2 days.  Heavy activities (lifting more than approximately 10 pounds) will be allowed after the follow up appointment in 1-2 weeks.  While the pain and discomfort within the wrist typically improves rapidly, some residual discomfort may be present for up to 6 weeks.  The nodule that is typically palpable in the palmar aspect of the hand will not be removed, as this would necessitate removal of a portion of the flexor tendon, however the catching, clicking, and locking should resolve.  Approximate success rate is 98%.The risks and benefits of the procedure were explained to the patient, which include, but are not limited to: Bleeding, infection, recurrence, pain, scar, damage to tendons, damage to nerves, and damage to blood vessels, need for future surgery and complications related to anesthesia.  If bony work is done, risks also include malunion and nonunion.  These risks, along with alternative conservative treatment options, and postoperative protocols were voiced back and understood by the patient.  All questions were answered to the  patient's satisfaction.  The patient agrees to comply with a standard postoperative protocol, and is willing to proceed.  Education was provided via written and auditory forms.  There were no barriers to learning. Written handouts regarding wound care, incision and scar care, and general preoperative information, as well as risks and benefits were provided to the patient.    ____________________________________________________________________________________________________________________________________________      CHIEF COMPLAINT:  Right trigger index finger    SUBJECTIVE:  Александр Perez is a 64 y.o. male who presents with right index finger trigger finger and right carpal tunnel syndrome. Seen roughly 3 months prior in which patient was given a steroid injection and started with nighttime splinting. Since then he has had very mild improvement in triggering. Still mechanical pain and clicking mostly at night and morning. Paresthesias responding to nighttime splinting, but still intermittently aggravated throughout week. Otherwise no new complaints.    I have personally reviewed all the relevant PMH, PSH, SH, FH, Medications and allergies      PAST MEDICAL HISTORY:  Past Medical History:   Diagnosis Date    Anesthesia complication     difficulty waking up    Padilla esophagus     Colon polyp     COPD (chronic obstructive pulmonary disease) ([facility])     COVID-19 11/03/2021    Diverticulitis     colon resection    Diverticulosis     Hyperlipidemia     Hypertension     Wears glasses        PAST SURGICAL HISTORY:  Past Surgical History:   Procedure Laterality Date    BICEPS TENDON REPAIR      BOWEL RESECTION      due to diverticulitis    COLONOSCOPY      HERNIA REPAIR         FAMILY HISTORY:  Family History   Problem Relation Age of Onset    Esophageal cancer Sister        SOCIAL HISTORY:  Social History     Tobacco Use    Smoking status: Former     Current packs/day: 0.00     Types: Cigarettes     Quit date:  "2018     Years since quittin.0    Smokeless tobacco: Never   Vaping Use    Vaping status: Never Used   Substance Use Topics    Alcohol use: Yes     Comment: occ beer    Drug use: Not Currently     Types: Marijuana       MEDICATIONS:    Current Outpatient Medications:     atorvastatin (LIPITOR) 20 mg tablet, Take 20 mg by mouth daily, Disp: , Rfl:     Black Pepper-Turmeric (TURMERIC COMPLEX/BLACK PEPPER PO), Take 2,000 mg by mouth in the morning Last dose 23, Disp: , Rfl:     Coenzyme Q10 (Co Q 10) 100 MG CAPS, Take by mouth in the morning, Disp: , Rfl:     multivitamin (THERAGRAN) TABS, Take 1 tablet by mouth daily Men 50 plus, Disp: , Rfl:     omeprazole (PriLOSEC) 20 mg delayed release capsule, Take 1 capsule (20 mg total) by mouth daily, Disp: 90 capsule, Rfl: 3    sildenafil (VIAGRA) 100 mg tablet, Take 100 mg by mouth as needed, Disp: , Rfl:     valsartan (DIOVAN) 160 mg tablet, Take 160 mg by mouth every morning, Disp: , Rfl:     polyethylene glycol (GOLYTELY) 4000 mL solution, Take 4,000 mL by mouth once for 1 dose, Disp: 4000 mL, Rfl: 0    ALLERGIES:  Allergies   Allergen Reactions    Nsaids Hives     Reaction Date: 2012;              REVIEW OF SYSTEMS:  Pertinent items are noted in HPI.  A comprehensive review of systems was negative.    VITALS:  There were no vitals filed for this visit.    LABS:  HgA1c: No results found for: \"HGBA1C\"  BMP:   Lab Results   Component Value Date    CALCIUM 8.7 2023    K 4.1 2023    CO2 25 2023     2023    BUN 15 2023    CREATININE 1.12 2023       _____________________________________________________  PHYSICAL EXAMINATION:  General: well developed and well nourished, alert, oriented times 3, and appears comfortable  Psychiatric: Normal  HEENT: Normocephalic, Atraumatic Trachea Midline, No torticollis  Pulmonary: No audible wheezing or respiratory distress   Abdomen/GI: Non tender, non distended   Cardiovascular: No " "pitting edema, 2+ radial pulse   Skin: No masses, erythema, lacerations, fluctation, ulcerations  Neurovascular: Sensation Intact to the Median, Ulnar, Radial Nerve, Motor Intact to the Median, Ulnar, Radial Nerve, and Pulses Intact  Musculoskeletal: Normal, except as noted in detailed exam and in HPI.        FOCUSED MUSCULOSKELETAL EXAMINATION:  Right Upper Extremity  Inspection: skin intact, no notable deformity   Palpation: ttp at A1 pulley of right index finger  Neurologic: 5/5 elbow flexion, 5/5 elbow extension, 5/5 wrist extension, 5/5 wrist flexion, 5/5 finger flexion, 5/5 finger extension, 5/5 FPL, 5/5 EPL, 5/5 APB, 5/5 intrinsics, sensation intact to median, radial, and ulnar nerve distributions  Vascular: Palpable radial pulse, brisk cap refill <2sec, hand warm and well perfused  MSK:   Mild clicking with range of motion of right index finger   ___________________________________________________  STUDIES REVIEWED:  Xrays of the right hand were obtained on 10/22/24 were independently reviewed which demonstrates no acute fracture or dislocation    LABS REVIEWED:    HgA1c: No results found for: \"HGBA1C\"  BMP:   Lab Results   Component Value Date    CALCIUM 8.7 01/17/2023    K 4.1 01/17/2023    CO2 25 01/17/2023     01/17/2023    BUN 15 01/17/2023    CREATININE 1.12 01/17/2023               PROCEDURES PERFORMED:  Hand/upper extremity injection  Universal Protocol:  procedure performed by consultantConsent: Verbal consent obtained.  Risks and benefits: risks, benefits and alternatives were discussed  Consent given by: patient  Time out: Immediately prior to procedure a \"time out\" was called to verify the correct patient, procedure, equipment, support staff and site/side marked as required.  Patient identity confirmed: verbally with patient  Supporting Documentation  Indications: pain   Procedure Details  Condition:trigger finger Needle size: 25 G  Ultrasound guidance: no  Medications administered: 1 mL " lidocaine 1 %; 6 mg betamethasone acetate-betamethasone sodium phosphate 6 (3-3) mg/mL    Dx: right index trigger finger    PROCEDURE: A timeout was performed in which the side, site and laterality were confirmed by all present. After informed consent was obtained under sterile conditions a combination of 1% lidocaine and 6 mg betamethasone acetate-betamethasone sodium phosphate 6 (3-3) mg/mL were combined in a 1:1 mixture for a total of 2 mL. The area of injection was cleansed with an alcohol preparation and the right index finger A1 pulley was subsequently injected with the above mixture with no untoward complications. The patient tolerated the procedure well. No samples, findings or blood loss was noted due to the fact that this was an injection.              _____________________________________________________    I agree with the history, physical examination, assessment and plan of care as documented above.    Huan Mujica M.D.  Attending, Orthopaedic Surgery  Hand, Wrist, and Elbow Surgery  [facility]